# Patient Record
Sex: FEMALE | Race: WHITE | NOT HISPANIC OR LATINO | URBAN - METROPOLITAN AREA
[De-identification: names, ages, dates, MRNs, and addresses within clinical notes are randomized per-mention and may not be internally consistent; named-entity substitution may affect disease eponyms.]

---

## 2017-03-18 ENCOUNTER — EMERGENCY (EMERGENCY)
Facility: HOSPITAL | Age: 42
LOS: 1 days | Discharge: HOME | End: 2017-03-18
Attending: EMERGENCY MEDICINE | Admitting: EMERGENCY MEDICINE
Payer: COMMERCIAL

## 2017-03-18 VITALS
TEMPERATURE: 97 F | HEIGHT: 64 IN | OXYGEN SATURATION: 100 % | SYSTOLIC BLOOD PRESSURE: 133 MMHG | DIASTOLIC BLOOD PRESSURE: 92 MMHG | RESPIRATION RATE: 15 BRPM | WEIGHT: 160.06 LBS | HEART RATE: 84 BPM

## 2017-03-18 DIAGNOSIS — R11.0 NAUSEA: ICD-10-CM

## 2017-03-18 DIAGNOSIS — E03.9 HYPOTHYROIDISM, UNSPECIFIED: ICD-10-CM

## 2017-03-18 DIAGNOSIS — R07.9 CHEST PAIN, UNSPECIFIED: ICD-10-CM

## 2017-03-18 DIAGNOSIS — J34.2 DEVIATED NASAL SEPTUM: Chronic | ICD-10-CM

## 2017-03-18 LAB
ALBUMIN SERPL ELPH-MCNC: 4 G/DL — SIGNIFICANT CHANGE UP (ref 3.3–5)
ALP SERPL-CCNC: 74 U/L — SIGNIFICANT CHANGE UP (ref 40–120)
ALT FLD-CCNC: 29 U/L — SIGNIFICANT CHANGE UP (ref 12–78)
ANION GAP SERPL CALC-SCNC: 11 MMOL/L — SIGNIFICANT CHANGE UP (ref 5–17)
AST SERPL-CCNC: 20 U/L — SIGNIFICANT CHANGE UP (ref 15–37)
BASOPHILS # BLD AUTO: 0.1 K/UL — SIGNIFICANT CHANGE UP (ref 0–0.2)
BASOPHILS NFR BLD AUTO: 1.2 % — SIGNIFICANT CHANGE UP (ref 0–2)
BILIRUB SERPL-MCNC: 0.5 MG/DL — SIGNIFICANT CHANGE UP (ref 0.2–1.2)
BUN SERPL-MCNC: 15 MG/DL — SIGNIFICANT CHANGE UP (ref 7–23)
CALCIUM SERPL-MCNC: 8.8 MG/DL — SIGNIFICANT CHANGE UP (ref 8.5–10.1)
CHLORIDE SERPL-SCNC: 105 MMOL/L — SIGNIFICANT CHANGE UP (ref 96–108)
CK MB BLD-MCNC: <0.5 % — SIGNIFICANT CHANGE UP (ref 0–3.5)
CK MB CFR SERPL CALC: <0.5 NG/ML — SIGNIFICANT CHANGE UP (ref 0–3.6)
CK SERPL-CCNC: 93 U/L — SIGNIFICANT CHANGE UP (ref 26–192)
CO2 SERPL-SCNC: 24 MMOL/L — SIGNIFICANT CHANGE UP (ref 22–31)
CREAT SERPL-MCNC: 0.88 MG/DL — SIGNIFICANT CHANGE UP (ref 0.5–1.3)
D DIMER BLD IA.RAPID-MCNC: 291 NG/ML DDU — HIGH
EOSINOPHIL # BLD AUTO: 0.1 K/UL — SIGNIFICANT CHANGE UP (ref 0–0.5)
EOSINOPHIL NFR BLD AUTO: 1.6 % — SIGNIFICANT CHANGE UP (ref 0–6)
GLUCOSE SERPL-MCNC: 91 MG/DL — SIGNIFICANT CHANGE UP (ref 70–99)
HCG SERPL-ACNC: <1 MIU/ML — SIGNIFICANT CHANGE UP
HCT VFR BLD CALC: 45.2 % — HIGH (ref 34.5–45)
HGB BLD-MCNC: 15 G/DL — SIGNIFICANT CHANGE UP (ref 11.5–15.5)
LYMPHOCYTES # BLD AUTO: 3 K/UL — SIGNIFICANT CHANGE UP (ref 1–3.3)
LYMPHOCYTES # BLD AUTO: 33.1 % — SIGNIFICANT CHANGE UP (ref 13–44)
MCHC RBC-ENTMCNC: 29.2 PG — SIGNIFICANT CHANGE UP (ref 27–34)
MCHC RBC-ENTMCNC: 33.1 GM/DL — SIGNIFICANT CHANGE UP (ref 32–36)
MCV RBC AUTO: 88.1 FL — SIGNIFICANT CHANGE UP (ref 80–100)
MONOCYTES # BLD AUTO: 0.4 K/UL — SIGNIFICANT CHANGE UP (ref 0–0.9)
MONOCYTES NFR BLD AUTO: 4.8 % — SIGNIFICANT CHANGE UP (ref 1–9)
NEUTROPHILS # BLD AUTO: 5.4 K/UL — SIGNIFICANT CHANGE UP (ref 1.8–7.4)
NEUTROPHILS NFR BLD AUTO: 59.4 % — SIGNIFICANT CHANGE UP (ref 43–77)
NT-PROBNP SERPL-SCNC: 35 PG/ML — SIGNIFICANT CHANGE UP (ref 0–125)
PLATELET # BLD AUTO: 217 K/UL — SIGNIFICANT CHANGE UP (ref 150–400)
POTASSIUM SERPL-MCNC: 3.9 MMOL/L — SIGNIFICANT CHANGE UP (ref 3.5–5.3)
POTASSIUM SERPL-SCNC: 3.9 MMOL/L — SIGNIFICANT CHANGE UP (ref 3.5–5.3)
PROT SERPL-MCNC: 7.7 G/DL — SIGNIFICANT CHANGE UP (ref 6–8.3)
RBC # BLD: 5.13 M/UL — SIGNIFICANT CHANGE UP (ref 3.8–5.2)
RBC # FLD: 11 % — SIGNIFICANT CHANGE UP (ref 10.3–14.5)
SODIUM SERPL-SCNC: 140 MMOL/L — SIGNIFICANT CHANGE UP (ref 135–145)
TROPONIN I SERPL-MCNC: <.015 NG/ML — SIGNIFICANT CHANGE UP (ref 0.01–0.04)
WBC # BLD: 9.2 K/UL — SIGNIFICANT CHANGE UP (ref 3.8–10.5)
WBC # FLD AUTO: 9.2 K/UL — SIGNIFICANT CHANGE UP (ref 3.8–10.5)

## 2017-03-18 PROCEDURE — 99285 EMERGENCY DEPT VISIT HI MDM: CPT

## 2017-03-18 PROCEDURE — 71020: CPT | Mod: 26

## 2017-03-18 PROCEDURE — 71275 CT ANGIOGRAPHY CHEST: CPT | Mod: 26

## 2017-03-18 RX ORDER — LIDOCAINE 4 G/100G
10 CREAM TOPICAL ONCE
Qty: 0 | Refills: 0 | Status: COMPLETED | OUTPATIENT
Start: 2017-03-18 | End: 2017-03-18

## 2017-03-18 RX ORDER — KETOROLAC TROMETHAMINE 30 MG/ML
30 SYRINGE (ML) INJECTION ONCE
Qty: 0 | Refills: 0 | Status: DISCONTINUED | OUTPATIENT
Start: 2017-03-18 | End: 2017-03-18

## 2017-03-18 RX ORDER — SODIUM CHLORIDE 9 MG/ML
3 INJECTION INTRAMUSCULAR; INTRAVENOUS; SUBCUTANEOUS ONCE
Qty: 0 | Refills: 0 | Status: COMPLETED | OUTPATIENT
Start: 2017-03-18 | End: 2017-03-18

## 2017-03-18 RX ADMIN — Medication 30 MILLIGRAM(S): at 19:50

## 2017-03-18 RX ADMIN — LIDOCAINE 10 MILLILITER(S): 4 CREAM TOPICAL at 19:50

## 2017-03-18 RX ADMIN — SODIUM CHLORIDE 3 MILLILITER(S): 9 INJECTION INTRAMUSCULAR; INTRAVENOUS; SUBCUTANEOUS at 19:50

## 2017-03-18 NOTE — ED ADULT NURSE NOTE - OBJECTIVE STATEMENT
c/o worsening epigastric pain today.  ekg.  sr with no acute finding.  labs drawn and sent.  medicated as ordered.

## 2017-03-19 VITALS
DIASTOLIC BLOOD PRESSURE: 70 MMHG | TEMPERATURE: 98 F | HEART RATE: 75 BPM | OXYGEN SATURATION: 100 % | RESPIRATION RATE: 17 BRPM | SYSTOLIC BLOOD PRESSURE: 130 MMHG

## 2017-03-19 LAB — TROPONIN I SERPL-MCNC: <.015 NG/ML — SIGNIFICANT CHANGE UP (ref 0.01–0.04)

## 2017-03-19 PROCEDURE — 84702 CHORIONIC GONADOTROPIN TEST: CPT

## 2017-03-19 PROCEDURE — 85379 FIBRIN DEGRADATION QUANT: CPT

## 2017-03-19 PROCEDURE — 71275 CT ANGIOGRAPHY CHEST: CPT

## 2017-03-19 PROCEDURE — 96374 THER/PROPH/DIAG INJ IV PUSH: CPT

## 2017-03-19 PROCEDURE — 84484 ASSAY OF TROPONIN QUANT: CPT

## 2017-03-19 PROCEDURE — 82553 CREATINE MB FRACTION: CPT

## 2017-03-19 PROCEDURE — 80053 COMPREHEN METABOLIC PANEL: CPT

## 2017-03-19 PROCEDURE — 93005 ELECTROCARDIOGRAM TRACING: CPT

## 2017-03-19 PROCEDURE — 85027 COMPLETE CBC AUTOMATED: CPT

## 2017-03-19 PROCEDURE — 99284 EMERGENCY DEPT VISIT MOD MDM: CPT | Mod: 25

## 2017-03-19 PROCEDURE — 83880 ASSAY OF NATRIURETIC PEPTIDE: CPT

## 2017-03-19 PROCEDURE — 71046 X-RAY EXAM CHEST 2 VIEWS: CPT

## 2017-03-19 PROCEDURE — 82550 ASSAY OF CK (CPK): CPT

## 2022-08-01 PROBLEM — E03.9 HYPOTHYROIDISM, UNSPECIFIED: Chronic | Status: ACTIVE | Noted: 2017-03-18

## 2022-08-05 PROBLEM — Z00.00 ENCOUNTER FOR PREVENTIVE HEALTH EXAMINATION: Status: ACTIVE | Noted: 2022-08-05

## 2022-08-08 ENCOUNTER — APPOINTMENT (OUTPATIENT)
Dept: ORTHOPEDIC SURGERY | Facility: CLINIC | Age: 47
End: 2022-08-08

## 2022-08-08 VITALS — BODY MASS INDEX: 29.37 KG/M2 | WEIGHT: 172 LBS | HEIGHT: 64 IN

## 2022-08-08 DIAGNOSIS — M77.12 LATERAL EPICONDYLITIS, LEFT ELBOW: ICD-10-CM

## 2022-08-08 DIAGNOSIS — Z78.9 OTHER SPECIFIED HEALTH STATUS: ICD-10-CM

## 2022-08-08 PROCEDURE — J3490M: CUSTOM

## 2022-08-08 PROCEDURE — 99203 OFFICE O/P NEW LOW 30 MIN: CPT | Mod: 25

## 2022-08-08 PROCEDURE — 73080 X-RAY EXAM OF ELBOW: CPT | Mod: LT

## 2022-08-08 PROCEDURE — 20551 NJX 1 TENDON ORIGIN/INSJ: CPT

## 2022-08-08 NOTE — IMAGING
[de-identified] : Left elbow No swelling, no ecchymosis\par Tenderness to palpation over lateral epicondyle\par Full elbow flexion, extension, supination, pronation\par 5/5 strength in flexion, extension, supination, pronation\par Lateral Elbow pain with resisted wrist extension\par No Varus or Valgus instability\par Motor and sensory intact distally\par  [Left] : left elbow [There are no fractures, subluxations or dislocations. No significant abnormalities are seen] : There are no fractures, subluxations or dislocations. No significant abnormalities are seen

## 2022-08-08 NOTE — HISTORY OF PRESENT ILLNESS
[Gradual] : gradual [6] : 6 [0] : 0 [Localized] : localized [Sharp] : sharp [Tightness] : tightness [Intermittent] : intermittent [Household chores] : household chores [Nothing helps with pain getting better] : Nothing helps with pain getting better [de-identified] : pt presents here today  with left elbow pain for about 2 months\par no injury\par no treatment so far [] : no [FreeTextEntry1] : left elbow [FreeTextEntry5] : no injury [de-identified] : reaching things,pull stuff [de-identified] : nothing

## 2022-08-08 NOTE — ASSESSMENT
[FreeTextEntry1] : ATRAUMATIC LATERAL ELBOW PAIN, NORMAL XRAYS\par CSI FOR RELIEF\par PROPER GRIPPING, LIFTING REVIEWED\par

## 2023-08-14 ENCOUNTER — APPOINTMENT (OUTPATIENT)
Dept: OBGYN | Facility: CLINIC | Age: 48
End: 2023-08-14
Payer: COMMERCIAL

## 2023-08-14 ENCOUNTER — ASOB RESULT (OUTPATIENT)
Age: 48
End: 2023-08-14

## 2023-08-14 VITALS
SYSTOLIC BLOOD PRESSURE: 116 MMHG | HEIGHT: 64 IN | DIASTOLIC BLOOD PRESSURE: 82 MMHG | WEIGHT: 180 LBS | HEART RATE: 91 BPM | BODY MASS INDEX: 30.73 KG/M2

## 2023-08-14 DIAGNOSIS — U07.1 COVID-19: ICD-10-CM

## 2023-08-14 PROCEDURE — 76830 TRANSVAGINAL US NON-OB: CPT

## 2023-08-14 PROCEDURE — 76856 US EXAM PELVIC COMPLETE: CPT | Mod: 59

## 2023-08-14 PROCEDURE — 99396 PREV VISIT EST AGE 40-64: CPT

## 2023-08-14 NOTE — PHYSICAL EXAM
[Chaperone Present] : A chaperone was present in the examining room during all aspects of the physical examination [Appropriately responsive] : appropriately responsive [Alert] : alert [No Acute Distress] : no acute distress [No Lymphadenopathy] : no lymphadenopathy [Regular Rate Rhythm] : regular rate rhythm [Soft] : soft [Clear to Auscultation B/L] : clear to auscultation bilaterally [Non-tender] : non-tender [Non-distended] : non-distended [No Lesions] : no lesions [No Mass] : no mass [Oriented x3] : oriented x3 [Examination Of The Breasts] : a normal appearance [No Masses] : no breast masses were palpable [Labia Majora] : normal [Labia Minora] : normal [Normal] : normal [Enlarged ___ wks] : enlarged [unfilled] ~Uweeks [Anteversion] : anteverted [Uterine Adnexae] : normal [FreeTextEntry6] : No masses, nontender, no skin changes, no nipple discharge, no adenopathy. [Tenderness] : nontender

## 2023-08-14 NOTE — PLAN
[FreeTextEntry1] : Patient is a 48-year-old  2 para 2 last menstrual period 2023 Patient presents for annual visit,, denies any complaints Physical exam reveals a well-developed well-nourished female in no apparent distress,, BMI 31 Heart regular rhythm and rate, lungs clear, breast no mass nontender no skin change or nipple discharge no adenopathy, abdomen soft nontender no organomegaly. Pelvic exam shows normal female external genitalia, vagina lesions, cervix appropriate size nontender, uterus anteverted bulky approximate 12 weeks size, patient is known to have fibroids Adnexa no mass nontender. Pap smear was performed Patient has undergone a mammogram on  with negative findings Patient to undergo a pelvic sonogram to further assess the uterine findings Uterus 14.89 x 8.32 x 6.84 Cervical length 3.05 cm Endometrial thickness 29.5 mm Fibroids 1.  Posterior intramural 8.01 x 9.41 x 5.9 2.  Posterior lower uterine segment intramural 3.33 x 4.84 x 4.75 3.  Posterior segment mucosal 2.78 x 2.77 x 1.95 4.  Right lateral lower uterine segment dilated 4.12 x 5.06 x 3.84 Both ovaries not visualized No adnexal masses seen No free fluid seen Findings discussed at length with the patient Patient will be scheduled for endometrial sampling with history of thickened lining and heavy periods  [place] was present as a chaperone for the entire assessment and examination of this patient

## 2023-08-14 NOTE — HISTORY OF PRESENT ILLNESS
[FreeTextEntry1] : Patient is a 48-year-old  2 para 2 last menstrual period 2023 Patient presents for annual visit,, denies any complaint

## 2023-08-21 LAB — HPV HIGH+LOW RISK DNA PNL CVX: NOT DETECTED

## 2023-08-22 ENCOUNTER — APPOINTMENT (OUTPATIENT)
Dept: OBGYN | Facility: CLINIC | Age: 48
End: 2023-08-22
Payer: COMMERCIAL

## 2023-08-22 VITALS
WEIGHT: 181 LBS | HEART RATE: 94 BPM | DIASTOLIC BLOOD PRESSURE: 81 MMHG | BODY MASS INDEX: 31.07 KG/M2 | SYSTOLIC BLOOD PRESSURE: 121 MMHG

## 2023-08-22 DIAGNOSIS — N92.0 EXCESSIVE AND FREQUENT MENSTRUATION WITH REGULAR CYCLE: ICD-10-CM

## 2023-08-22 DIAGNOSIS — D25.9 LEIOMYOMA OF UTERUS, UNSPECIFIED: ICD-10-CM

## 2023-08-22 DIAGNOSIS — R93.89 ABNORMAL FINDINGS ON DIAGNOSTIC IMAGING OF OTHER SPECIFIED BODY STRUCTURES: ICD-10-CM

## 2023-08-22 PROCEDURE — 81025 URINE PREGNANCY TEST: CPT

## 2023-08-22 PROCEDURE — 99213 OFFICE O/P EST LOW 20 MIN: CPT

## 2023-08-22 NOTE — ASSESSMENT
[FreeTextEntry1] : Patient is a 48-year-old  2 para 2 last menstrual 2023 Patient presents for endometrial biopsy after findings of extremely thickened endometrium lining of 29 mm and enlarged fibroid uterus Patient was placed in the exam room After informed consent was obtained patient was placed in the dorsolithotomy position Sterile speculum is present vaginal vault and the cervix and vagina were cleansed with Betadine Anterior of the cervix was grasped with a single-tooth tenaculum Pipelle sampling catheter was attempted to be passed into the uterine cavity but this was unsuccessful after multiple tries Also was removed the vaginal vault Patient was brought to the consultation room office and discussed that endometrial sampling needs to be performed prior to definitive therapy with hysterectomy Patient will be scheduled for a hysteroscopy D&C under anesthesia in the OR Pending those results definitive therapy will be discussed Patient does complain of extremely heavy periods and pelvic and lower back pain due to the large fibroids  Past medical history patient denies Past surgical history,,, skin Mohs procedure x7 Allergies,,, penicillin Medications,,, patient denies Past OB history,,, vaginal livery x2 Past GYN history,,, medications 13, irregular duration 5 days, denies any history of breast triple use or history of PID or STDs Tobacco use,,, patient denies Alcohol use,,, patient denies Drug use,,, patient denies Family history,,, mother alive and well father alive history hypertension and renal disease denies any family history of breast or ovarian cancer

## 2023-08-22 NOTE — PROCEDURE
[Endometrial Biopsy] : Endometrial biopsy [Consent Obtained] : Consent obtained [Negative] : negative pregnancy test [LMPDate] : 8/1/23

## 2023-08-24 LAB — HCG UR QL: NEGATIVE

## 2023-09-27 ENCOUNTER — OUTPATIENT (OUTPATIENT)
Dept: OUTPATIENT SERVICES | Facility: HOSPITAL | Age: 48
LOS: 1 days | End: 2023-09-27
Payer: COMMERCIAL

## 2023-09-27 VITALS
WEIGHT: 185.19 LBS | HEIGHT: 64 IN | DIASTOLIC BLOOD PRESSURE: 80 MMHG | RESPIRATION RATE: 18 BRPM | SYSTOLIC BLOOD PRESSURE: 125 MMHG | OXYGEN SATURATION: 98 % | HEART RATE: 82 BPM | TEMPERATURE: 98 F

## 2023-09-27 DIAGNOSIS — N93.9 ABNORMAL UTERINE AND VAGINAL BLEEDING, UNSPECIFIED: ICD-10-CM

## 2023-09-27 DIAGNOSIS — Z29.9 ENCOUNTER FOR PROPHYLACTIC MEASURES, UNSPECIFIED: ICD-10-CM

## 2023-09-27 DIAGNOSIS — Z01.818 ENCOUNTER FOR OTHER PREPROCEDURAL EXAMINATION: ICD-10-CM

## 2023-09-27 DIAGNOSIS — R93.89 ABNORMAL FINDINGS ON DIAGNOSTIC IMAGING OF OTHER SPECIFIED BODY STRUCTURES: ICD-10-CM

## 2023-09-27 DIAGNOSIS — J34.2 DEVIATED NASAL SEPTUM: Chronic | ICD-10-CM

## 2023-09-27 DIAGNOSIS — Z98.890 OTHER SPECIFIED POSTPROCEDURAL STATES: Chronic | ICD-10-CM

## 2023-09-27 DIAGNOSIS — N92.0 EXCESSIVE AND FREQUENT MENSTRUATION WITH REGULAR CYCLE: ICD-10-CM

## 2023-09-27 LAB
A1C WITH ESTIMATED AVERAGE GLUCOSE RESULT: 5.3 % — SIGNIFICANT CHANGE UP (ref 4–5.6)
ANION GAP SERPL CALC-SCNC: 12 MMOL/L — SIGNIFICANT CHANGE UP (ref 5–17)
APTT BLD: 30.4 SEC — SIGNIFICANT CHANGE UP (ref 24.5–35.6)
BASOPHILS # BLD AUTO: 0.08 K/UL — SIGNIFICANT CHANGE UP (ref 0–0.2)
BASOPHILS NFR BLD AUTO: 0.9 % — SIGNIFICANT CHANGE UP (ref 0–2)
BLD GP AB SCN SERPL QL: SIGNIFICANT CHANGE UP
BUN SERPL-MCNC: 12.9 MG/DL — SIGNIFICANT CHANGE UP (ref 8–20)
CALCIUM SERPL-MCNC: 9 MG/DL — SIGNIFICANT CHANGE UP (ref 8.4–10.5)
CHLORIDE SERPL-SCNC: 102 MMOL/L — SIGNIFICANT CHANGE UP (ref 96–108)
CO2 SERPL-SCNC: 24 MMOL/L — SIGNIFICANT CHANGE UP (ref 22–29)
CREAT SERPL-MCNC: 0.78 MG/DL — SIGNIFICANT CHANGE UP (ref 0.5–1.3)
EGFR: 92 ML/MIN/1.73M2 — SIGNIFICANT CHANGE UP
EOSINOPHIL # BLD AUTO: 0.14 K/UL — SIGNIFICANT CHANGE UP (ref 0–0.5)
EOSINOPHIL NFR BLD AUTO: 1.7 % — SIGNIFICANT CHANGE UP (ref 0–6)
ESTIMATED AVERAGE GLUCOSE: 105 MG/DL — SIGNIFICANT CHANGE UP (ref 68–114)
GLUCOSE SERPL-MCNC: 89 MG/DL — SIGNIFICANT CHANGE UP (ref 70–99)
HCG SERPL-ACNC: <4 MIU/ML — SIGNIFICANT CHANGE UP
HCT VFR BLD CALC: 39.7 % — SIGNIFICANT CHANGE UP (ref 34.5–45)
HGB BLD-MCNC: 12.8 G/DL — SIGNIFICANT CHANGE UP (ref 11.5–15.5)
IMM GRANULOCYTES NFR BLD AUTO: 0.4 % — SIGNIFICANT CHANGE UP (ref 0–0.9)
INR BLD: 0.91 RATIO — SIGNIFICANT CHANGE UP (ref 0.85–1.18)
LYMPHOCYTES # BLD AUTO: 2.15 K/UL — SIGNIFICANT CHANGE UP (ref 1–3.3)
LYMPHOCYTES # BLD AUTO: 25.5 % — SIGNIFICANT CHANGE UP (ref 13–44)
MCHC RBC-ENTMCNC: 27.1 PG — SIGNIFICANT CHANGE UP (ref 27–34)
MCHC RBC-ENTMCNC: 32.2 GM/DL — SIGNIFICANT CHANGE UP (ref 32–36)
MCV RBC AUTO: 84.1 FL — SIGNIFICANT CHANGE UP (ref 80–100)
MONOCYTES # BLD AUTO: 0.39 K/UL — SIGNIFICANT CHANGE UP (ref 0–0.9)
MONOCYTES NFR BLD AUTO: 4.6 % — SIGNIFICANT CHANGE UP (ref 2–14)
NEUTROPHILS # BLD AUTO: 5.64 K/UL — SIGNIFICANT CHANGE UP (ref 1.8–7.4)
NEUTROPHILS NFR BLD AUTO: 66.9 % — SIGNIFICANT CHANGE UP (ref 43–77)
PLATELET # BLD AUTO: 261 K/UL — SIGNIFICANT CHANGE UP (ref 150–400)
POTASSIUM SERPL-MCNC: 3.8 MMOL/L — SIGNIFICANT CHANGE UP (ref 3.5–5.3)
POTASSIUM SERPL-SCNC: 3.8 MMOL/L — SIGNIFICANT CHANGE UP (ref 3.5–5.3)
PROTHROM AB SERPL-ACNC: 10.1 SEC — SIGNIFICANT CHANGE UP (ref 9.5–13)
RBC # BLD: 4.72 M/UL — SIGNIFICANT CHANGE UP (ref 3.8–5.2)
RBC # FLD: 13.5 % — SIGNIFICANT CHANGE UP (ref 10.3–14.5)
SODIUM SERPL-SCNC: 138 MMOL/L — SIGNIFICANT CHANGE UP (ref 135–145)
T3 SERPL-MCNC: 155 NG/DL — SIGNIFICANT CHANGE UP (ref 80–200)
T4 AB SER-ACNC: 9.7 UG/DL — SIGNIFICANT CHANGE UP (ref 4.5–12)
TSH SERPL-MCNC: 2.9 UIU/ML — SIGNIFICANT CHANGE UP (ref 0.27–4.2)
WBC # BLD: 8.43 K/UL — SIGNIFICANT CHANGE UP (ref 3.8–10.5)
WBC # FLD AUTO: 8.43 K/UL — SIGNIFICANT CHANGE UP (ref 3.8–10.5)

## 2023-09-27 PROCEDURE — G0463: CPT

## 2023-09-27 PROCEDURE — 93005 ELECTROCARDIOGRAM TRACING: CPT

## 2023-09-27 PROCEDURE — 93010 ELECTROCARDIOGRAM REPORT: CPT

## 2023-09-27 RX ORDER — LEVOTHYROXINE SODIUM 125 MCG
1 TABLET ORAL
Qty: 0 | Refills: 0 | DISCHARGE

## 2023-09-27 RX ORDER — HYDROCHLOROTHIAZIDE 25 MG
0 TABLET ORAL
Qty: 0 | Refills: 0 | DISCHARGE

## 2023-09-27 NOTE — H&P PST ADULT - ATTENDING COMMENTS
Patient seen and chart reviewed   Procedure discussed and consent obtained   Questions answered and instructions given  Menorrhagia , fibroid uterus,, Thickened andometrium   Hysteroscopy Fx D+C , possible polypectomy  No change in patient status

## 2023-09-27 NOTE — H&P PST ADULT - NSANTHOSAYNRD_GEN_A_CORE
Pt has persistent yeast infection.  Please advise   No. EDWIGE screening performed.  STOP BANG Legend: 0-2 = LOW Risk; 3-4 = INTERMEDIATE Risk; 5-8 = HIGH Risk

## 2023-09-27 NOTE — H&P PST ADULT - ASSESSMENT
47 y/o female with PMH of borderline hypothyroidism arrives from home to PST with complaints of uterine fibroids, heavy periods, and uterine wall thickening. Pt instructed to stop vitamins/supplements/herbal medications/ASA/NSAIDS for one week prior to surgery and discuss with PMD. Patient educated on surgical scrub, preadmission instructions, medical clearance and day of procedure medications, verbalizes understanding.    OPIOID RISK TOOL    ADRI EACH BOX THAT APPLIES AND ADD TOTALS AT THE END    FAMILY HISTORY OF SUBSTANCE ABUSE                 FEMALE         MALE                                                Alcohol                             [  ]1 pt          [  ]3pts                                               Illegal Durgs                     [  ]2 pts        [  ]3pts                                               Rx Drugs                           [  ]4 pts        [  ]4 pts    PERSONAL HISTORY OF SUBSTANCE ABUSE                                                                                          Alcohol                             [  ]3 pts       [  ]3 pts                                               Illegal Durgs                     [  ]4 pts        [  ]4 pts                                               Rx Drugs                           [  ]5 pts        [  ]5 pts    AGE BETWEEN 16-45 YEARS                                      [  ]1 pt         [  ]1 pt    HISTORY OF PREADOLESCENT   SEXUAL ABUSE                                                             [  ]3 pts        [  ]0pts    PSYCHOLOGICAL DISEASE                     ADD, OCD, Bipolar, Schizophrenia        [  ]2 pts         [  ]2 pts                      Depression                                               [  ]1 pt           [  ]1 pt           SCORING TOTAL   (add numbers and type here)              (**0*)                                     A score of 3 or lower indicated LOW risk for future opiod abuse  A score of 4 to 7 indicated moderate risk for future opiod abuse  A score of 8 or higher indicates a high risk for opiod abuse    CAPRINI SCORE    AGE RELATED RISK FACTORS                                                             [ x] Age 41-60 years                                            (1 Point)  [ ] Age: 61-74 years                                           (2 Points)                 [ ] Age= 75 years                                                (3 Points)             DISEASE RELATED RISK FACTORS                                                       [ ] Edema in the lower extremities                 (1 Point)                     [ ] Varicose veins                                               (1 Point)                                 [x ] BMI > 25 Kg/m2                                            (1 Point)                                  [ ] Serious infection (ie PNA)                            (1 Point)                     [ ] Lung disease ( COPD, Emphysema)            (1 Point)                                                                          [ ] Acute myocardial infarction                         (1 Point)                  [ ] Congestive heart failure (in the previous month)  (1 Point)         [ ] Inflammatory bowel disease                            (1 Point)                  [ ] Central venous access, PICC or Port               (2 points)       (within the last month)                                                                [ ] Stroke (in the previous month)                        (5 Points)    [ ] Previous or present malignancy                       (2 points)                                                                                                                                                         HEMATOLOGY RELATED FACTORS                                                         [ ] Prior episodes of VTE                                     (3 Points)                     [ ] Positive family history for VTE                      (3 Points)                  [ ] Prothrombin 46346 A                                     (3 Points)                     [ ] Factor V Leiden                                                (3 Points)                        [ ] Lupus anticoagulants                                      (3 Points)                                                           [ ] Anticardiolipin antibodies                              (3 Points)                                                       [ ] High homocysteine in the blood                   (3 Points)                                             [ ] Other congenital or acquired thrombophilia      (3 Points)                                                [ ] Heparin induced thrombocytopenia                  (3 Points)                                        MOBILITY RELATED FACTORS  [ ] Bed rest                                                         (1 Point)  [ ] Plaster cast                                                    (2 points)  [ ] Bed bound for more than 72 hours           (2 Points)    GENDER SPECIFIC FACTORS  [ ] Pregnancy or had a baby within the last month   (1 Point)  [ ] Post-partum < 6 weeks                                   (1 Point)  [ ] Hormonal therapy  or oral contraception   (1 Point)  [ ] History of pregnancy complications              (1 point)  [ ] Unexplained or recurrent              (1 Point)    OTHER RISK FACTORS                                           (1 Point)  [ ] BMI >40, smoking, diabetes requiring insulin, chemotherapy  blood transfusions and length of surgery over 2 hours    SURGERY RELATED RISK FACTORS  [ ]  Section within the last month     (1 Point)  [ x] Minor surgery                                                  (1 Point)  [ ] Arthroscopic surgery                                       (2 Points)  [ ] Planned major surgery lasting more            (2 Points)      than 45 minutes     [ ] Elective hip or knee joint replacement       (5 points)       surgery                                                TRAUMA RELATED RISK FACTORS  [ ] Fracture of the hip, pelvis, or leg                       (5 Points)  [ ] Spinal cord injury resulting in paralysis             (5 points)       (in the previous month)    [ ] Paralysis  (less than 1 month)                             (5 Points)  [ ] Multiple Trauma within 1 month                        (5 Points)    Total Score [    3    ]    Caprini Score 0-2: Low Risk, NO VTE prophylaxis required for most patients, encourage ambulation  Caprini Score 3-6: Moderate Risk , pharmacologic VTE prophylaxis is indicated for most patients (in the absence of contraindications)  Caprini Score Greater than or =7: High risk, pharmocologic VTE prophylaxis indicated for most patients (in the absence of contraindications)

## 2023-09-27 NOTE — H&P PST ADULT - HISTORY OF PRESENT ILLNESS
Patient is a 48-year-old  2 para 2 last menstrual period 2023  Patient presents for annual visit,, denies any complaint  Patient is a 48-year-old  2 para 2 last menstrual period 2023  Patient presents for annual visit,, denies any complaint     uterine wall thickening    huge fibroids      Patient is a 48-year-old  2 para 2 last menstrual period 2023  Patient presents for annual visit,, denies any complaint     uterine wall thickening    huge fibroids   23  heavy periods   denies any bleeding at this time     49 y/o female with PMH of borderline hypothyroidism arrives from home to PST with complaints of uterine fibroids, heavy periods, and uterine wall thickening. Reports that during an annual visit was told her uterine wall appeared thicker and to have a biopsy. She had a discussion with the surgeon to have a hysterectomy due to hx of large fibroids that she is also thinking of having done. Her LMP was 23 and  . She denies any pain or vaginal bleeding at this time. She is scheduled for Dilation and Curettage Hysteroscopy with MD Hermosillo on 10/11/23.

## 2023-09-27 NOTE — ED ADULT NURSE NOTE - ILLNESS RECENT EXPOSURE
You can access the FollowMyHealth Patient Portal offered by [facility] by registering at the following website: [email or web address]. By joining [email or web address]’s FollowMyHealth portal, you will also be able to view your health information using other applications (apps) compatible with our system. None known

## 2023-09-27 NOTE — H&P PST ADULT - NSANTHAGERD_ENT_A_CORE
Patient seen and examined by me in clinic.  She is aware of the problems, proposed management and alternatives.  She wants to go ahead with the surgery.
Yes

## 2023-10-10 ENCOUNTER — TRANSCRIPTION ENCOUNTER (OUTPATIENT)
Age: 48
End: 2023-10-10

## 2023-10-11 ENCOUNTER — RESULT REVIEW (OUTPATIENT)
Age: 48
End: 2023-10-11

## 2023-10-11 ENCOUNTER — APPOINTMENT (OUTPATIENT)
Dept: OBGYN | Facility: HOSPITAL | Age: 48
End: 2023-10-11
Payer: COMMERCIAL

## 2023-10-11 ENCOUNTER — TRANSCRIPTION ENCOUNTER (OUTPATIENT)
Age: 48
End: 2023-10-11

## 2023-10-11 ENCOUNTER — OUTPATIENT (OUTPATIENT)
Dept: INPATIENT UNIT | Facility: HOSPITAL | Age: 48
LOS: 1 days | End: 2023-10-11
Payer: COMMERCIAL

## 2023-10-11 VITALS
OXYGEN SATURATION: 97 % | WEIGHT: 185.19 LBS | SYSTOLIC BLOOD PRESSURE: 118 MMHG | DIASTOLIC BLOOD PRESSURE: 73 MMHG | TEMPERATURE: 99 F | RESPIRATION RATE: 18 BRPM | HEART RATE: 72 BPM | HEIGHT: 64 IN

## 2023-10-11 VITALS
TEMPERATURE: 98 F | RESPIRATION RATE: 14 BRPM | SYSTOLIC BLOOD PRESSURE: 107 MMHG | HEART RATE: 80 BPM | OXYGEN SATURATION: 96 % | DIASTOLIC BLOOD PRESSURE: 64 MMHG

## 2023-10-11 DIAGNOSIS — R93.89 ABNORMAL FINDINGS ON DIAGNOSTIC IMAGING OF OTHER SPECIFIED BODY STRUCTURES: ICD-10-CM

## 2023-10-11 DIAGNOSIS — N92.0 EXCESSIVE AND FREQUENT MENSTRUATION WITH REGULAR CYCLE: ICD-10-CM

## 2023-10-11 DIAGNOSIS — J34.2 DEVIATED NASAL SEPTUM: Chronic | ICD-10-CM

## 2023-10-11 DIAGNOSIS — Z98.890 OTHER SPECIFIED POSTPROCEDURAL STATES: Chronic | ICD-10-CM

## 2023-10-11 PROCEDURE — 88305 TISSUE EXAM BY PATHOLOGIST: CPT | Mod: 26

## 2023-10-11 PROCEDURE — 88305 TISSUE EXAM BY PATHOLOGIST: CPT

## 2023-10-11 PROCEDURE — 58558 HYSTEROSCOPY BIOPSY: CPT

## 2023-10-11 RX ORDER — ACETAMINOPHEN 500 MG
2 TABLET ORAL
Qty: 24 | Refills: 0
Start: 2023-10-11 | End: 2023-10-13

## 2023-10-11 RX ORDER — FENTANYL CITRATE 50 UG/ML
25 INJECTION INTRAVENOUS
Refills: 0 | Status: DISCONTINUED | OUTPATIENT
Start: 2023-10-11 | End: 2023-10-11

## 2023-10-11 RX ORDER — HYDROMORPHONE HYDROCHLORIDE 2 MG/ML
0.5 INJECTION INTRAMUSCULAR; INTRAVENOUS; SUBCUTANEOUS
Refills: 0 | Status: DISCONTINUED | OUTPATIENT
Start: 2023-10-11 | End: 2023-10-11

## 2023-10-11 RX ORDER — IBUPROFEN 200 MG
1 TABLET ORAL
Qty: 12 | Refills: 0
Start: 2023-10-11 | End: 2023-10-13

## 2023-10-11 RX ORDER — ONDANSETRON 8 MG/1
4 TABLET, FILM COATED ORAL ONCE
Refills: 0 | Status: COMPLETED | OUTPATIENT
Start: 2023-10-11 | End: 2023-10-11

## 2023-10-11 RX ORDER — CELECOXIB 200 MG/1
400 CAPSULE ORAL ONCE
Refills: 0 | Status: COMPLETED | OUTPATIENT
Start: 2023-10-11 | End: 2023-10-11

## 2023-10-11 RX ORDER — ACETAMINOPHEN 500 MG
650 TABLET ORAL EVERY 6 HOURS
Refills: 0 | Status: DISCONTINUED | OUTPATIENT
Start: 2023-10-11 | End: 2023-10-25

## 2023-10-11 RX ORDER — IBUPROFEN 200 MG
600 TABLET ORAL EVERY 6 HOURS
Refills: 0 | Status: DISCONTINUED | OUTPATIENT
Start: 2023-10-11 | End: 2023-10-25

## 2023-10-11 RX ORDER — SODIUM CHLORIDE 9 MG/ML
3 INJECTION INTRAMUSCULAR; INTRAVENOUS; SUBCUTANEOUS ONCE
Refills: 0 | Status: DISCONTINUED | OUTPATIENT
Start: 2023-10-11 | End: 2023-10-11

## 2023-10-11 RX ORDER — ACETAMINOPHEN 500 MG
975 TABLET ORAL ONCE
Refills: 0 | Status: COMPLETED | OUTPATIENT
Start: 2023-10-11 | End: 2023-10-11

## 2023-10-11 RX ADMIN — Medication 600 MILLIGRAM(S): at 13:41

## 2023-10-11 RX ADMIN — Medication 600 MILLIGRAM(S): at 13:17

## 2023-10-11 RX ADMIN — Medication 975 MILLIGRAM(S): at 09:24

## 2023-10-11 RX ADMIN — ONDANSETRON 4 MILLIGRAM(S): 8 TABLET, FILM COATED ORAL at 13:01

## 2023-10-11 RX ADMIN — HYDROMORPHONE HYDROCHLORIDE 0.5 MILLIGRAM(S): 2 INJECTION INTRAMUSCULAR; INTRAVENOUS; SUBCUTANEOUS at 12:05

## 2023-10-11 RX ADMIN — CELECOXIB 400 MILLIGRAM(S): 200 CAPSULE ORAL at 09:24

## 2023-10-11 RX ADMIN — HYDROMORPHONE HYDROCHLORIDE 0.5 MILLIGRAM(S): 2 INJECTION INTRAMUSCULAR; INTRAVENOUS; SUBCUTANEOUS at 11:50

## 2023-10-11 NOTE — BRIEF OPERATIVE NOTE - NSICDXBRIEFPROCEDURE_GEN_ALL_CORE_FT
PROCEDURES:  Hysteroscopy 11-Oct-2023 11:18:44  Tier, Sejal  Dilation and curettage, uterus 11-Oct-2023 11:18:51  Sejal Rebolledo

## 2023-10-11 NOTE — ASU PREOP CHECKLIST - SURGICAL CONSENT
EDUCATION: Patient education given on endocrine follow up and the patient expresses understanding and acceptance of instructions.  Bella Larios RN 5/9/2019 10:02 PM 
 
 done

## 2023-10-11 NOTE — BRIEF OPERATIVE NOTE - OPERATION/FINDINGS
Grossly normal cervix. Uterus inspected on hysteroscopy and found to have thickened endometrial lining. Dilation & curettage was preformed, ECC sample sent to pathology. An intramural uterine fibroid was noted in the anterior wall of the uterus. No polyps visualized.

## 2023-10-11 NOTE — ASU PREOP CHECKLIST - PATIENT SENT TO
Comments (Non-Sticky): Cleansed with ELTA foaming removed with cool 4x4 used skinbetter scrub removed with cool towel extracted white heads all over face used aha mask in revision line removed with cool 4x4 applied sheer hydration with spf
Price (Use Numbers Only, No Special Characters Or $): 75
Detail Level: Detailed
Extraction Method: extractor
Exfoliation Type Override: refresh exfoliating scrub/mask
Treatment Type (Optional): Deep Cleanse Treatment
Mask Type (Optional): purifying
Treatment Type Override: detox and clean facial
Exfoliation Type: gentle
operating room

## 2023-10-11 NOTE — BRIEF OPERATIVE NOTE - NSICDXBRIEFPOSTOP_GEN_ALL_CORE_FT
POST-OP DIAGNOSIS:  Thickened endometrium 11-Oct-2023 11:19:08  Amaury, Sejal  Intramural uterine fibroid 11-Oct-2023 11:19:25  Sejal Rebolledo

## 2023-10-11 NOTE — ASU DISCHARGE PLAN (ADULT/PEDIATRIC) - CARE PROVIDER_API CALL
Radha Hermosillo  Obstetrics and Gynecology  [address] [place] [id number]  Phone: [phone]  Fax: [phone]  Established Patient  Follow Up Time: 1 week

## 2023-10-18 LAB
SURGICAL PATHOLOGY STUDY: SIGNIFICANT CHANGE UP
SURGICAL PATHOLOGY STUDY: SIGNIFICANT CHANGE UP

## 2023-10-24 ENCOUNTER — APPOINTMENT (OUTPATIENT)
Dept: OBGYN | Facility: CLINIC | Age: 48
End: 2023-10-24
Payer: COMMERCIAL

## 2023-10-24 VITALS
DIASTOLIC BLOOD PRESSURE: 88 MMHG | HEART RATE: 105 BPM | BODY MASS INDEX: 31.07 KG/M2 | WEIGHT: 181 LBS | SYSTOLIC BLOOD PRESSURE: 132 MMHG

## 2023-10-24 DIAGNOSIS — Z98.890 OTHER SPECIFIED POSTPROCEDURAL STATES: ICD-10-CM

## 2023-10-24 PROCEDURE — 99024 POSTOP FOLLOW-UP VISIT: CPT

## 2024-01-08 NOTE — ED PROVIDER NOTE - HEAD, MLM
Spoke with patient advised, a lot of people are sick right now and appointments are filling up fast. Patient made appointment with Dr Martinez for 10:00 am   
[facility] Appointment Request   Provider: Dr. Genao Only  Appointment Type: Same Day  Reason for Visit: Painful urination since Sunday, January 7th recently came off of Antibiotic   Available Day and Time: Today  Best Contact Number: [phone]      
Head is atraumatic. Head shape is symmetrical.

## 2024-01-29 NOTE — ED PROVIDER NOTE - PSH
Wound Clinic Physician Orders and Discharge Instructions  [facility]  [address], [address]  [place] [address]  Telephone: [phone]     FAX [phone]    NAME:  Ramin Brown Jr.  YOB: 1946  MEDICAL RECORD NUMBER:  [id number]  DATE:  1/29/2024      Return Appointment:  [] Dressing Supply Provider:   [x] Home Healthcare: [facility]. Fax [phone]  [x] Return Appointment: 1 Week(s)  [] Nurse Visit:     [] [facility]charge from [facility]: [] Healed            [] Refer to Provider:    Follow-up Information:  [x] Ordered Tests: culture obtained in clinic  [x] Referrals: Dr. Mcclellan  [] Rx:   [x] Other: Lymph pump twice daily x 1 hour each    Wound Cleansing:   Do not scrub or use excessive force.  Cleanse wound prior to applying a clean dressing with:  [] Normal Saline   [] Keep Wound Dry in Shower     [] Wound Cleanser   [x] Cleanse wound with Mild Soap & Water    [] Other:       Topical Treatments:  Do not apply lotions, creams, or ointments to wound bed unless directed.   [] Apply moisturizing lotion to skin surrounding the wound prior to dressing change.  [] Apply antifungal ointment to skin surrounding the wound prior to dressing change.  [] Apply thin film of moisture barrier ointment to skin immediately around wound.  [] Apply Betadine to skim immediately around wound      Dressings:           Wound Location R Leg   [x] Apply Primary Dressing:       [] MediHoney Gel [] MediHoney Alginate  [x] Calcium Alginate with Silver   [] Calcium Alginate without silver   [] Collagen with silver [] Collagen without Silver    [] Santyl with moist saline gauze     [] Hydrofera Blue (cut to size and moistened with normal saline)  [] Hydrofera Blue Ready (cut to size)      [] Normal Saline wet to dry  [] Betadine wet to dry    [] Hydrogel  [] Mepitel     [] Bactroban/Mupirocin   [] Iodoform Packing Strip [] Plain Packing Strip   [] Skin Sub:   [] Other:     [x] Cover and  Deviated septum

## 2024-03-21 ENCOUNTER — OUTPATIENT (OUTPATIENT)
Dept: OUTPATIENT SERVICES | Facility: HOSPITAL | Age: 49
LOS: 1 days | End: 2024-03-21
Payer: COMMERCIAL

## 2024-03-21 ENCOUNTER — NON-APPOINTMENT (OUTPATIENT)
Age: 49
End: 2024-03-21

## 2024-03-21 VITALS
SYSTOLIC BLOOD PRESSURE: 118 MMHG | DIASTOLIC BLOOD PRESSURE: 60 MMHG | HEIGHT: 63 IN | HEART RATE: 76 BPM | WEIGHT: 187.83 LBS | RESPIRATION RATE: 18 BRPM | OXYGEN SATURATION: 97 % | TEMPERATURE: 98 F

## 2024-03-21 DIAGNOSIS — N92.0 EXCESSIVE AND FREQUENT MENSTRUATION WITH REGULAR CYCLE: ICD-10-CM

## 2024-03-21 DIAGNOSIS — Z01.818 ENCOUNTER FOR OTHER PREPROCEDURAL EXAMINATION: ICD-10-CM

## 2024-03-21 DIAGNOSIS — D25.9 LEIOMYOMA OF UTERUS, UNSPECIFIED: ICD-10-CM

## 2024-03-21 DIAGNOSIS — Z98.890 OTHER SPECIFIED POSTPROCEDURAL STATES: Chronic | ICD-10-CM

## 2024-03-21 DIAGNOSIS — K46.9 UNSPECIFIED ABDOMINAL HERNIA WITHOUT OBSTRUCTION OR GANGRENE: ICD-10-CM

## 2024-03-21 DIAGNOSIS — Z29.9 ENCOUNTER FOR PROPHYLACTIC MEASURES, UNSPECIFIED: ICD-10-CM

## 2024-03-21 DIAGNOSIS — E03.9 HYPOTHYROIDISM, UNSPECIFIED: ICD-10-CM

## 2024-03-21 DIAGNOSIS — J34.2 DEVIATED NASAL SEPTUM: Chronic | ICD-10-CM

## 2024-03-21 LAB
A1C WITH ESTIMATED AVERAGE GLUCOSE RESULT: 5.9 % — HIGH (ref 4–5.6)
ALBUMIN SERPL ELPH-MCNC: 4 G/DL — SIGNIFICANT CHANGE UP (ref 3.3–5.2)
ALP SERPL-CCNC: 86 U/L — SIGNIFICANT CHANGE UP (ref 40–120)
ALT FLD-CCNC: 25 U/L — SIGNIFICANT CHANGE UP
ANION GAP SERPL CALC-SCNC: 12 MMOL/L — SIGNIFICANT CHANGE UP (ref 5–17)
APTT BLD: 29.6 SEC — SIGNIFICANT CHANGE UP (ref 24.5–35.6)
AST SERPL-CCNC: 31 U/L — SIGNIFICANT CHANGE UP
BASOPHILS # BLD AUTO: 0.06 K/UL — SIGNIFICANT CHANGE UP (ref 0–0.2)
BASOPHILS NFR BLD AUTO: 0.9 % — SIGNIFICANT CHANGE UP (ref 0–2)
BILIRUB SERPL-MCNC: 0.5 MG/DL — SIGNIFICANT CHANGE UP (ref 0.4–2)
BLD GP AB SCN SERPL QL: SIGNIFICANT CHANGE UP
BUN SERPL-MCNC: 13.4 MG/DL — SIGNIFICANT CHANGE UP (ref 8–20)
CALCIUM SERPL-MCNC: 9 MG/DL — SIGNIFICANT CHANGE UP (ref 8.4–10.5)
CHLORIDE SERPL-SCNC: 102 MMOL/L — SIGNIFICANT CHANGE UP (ref 96–108)
CO2 SERPL-SCNC: 24 MMOL/L — SIGNIFICANT CHANGE UP (ref 22–29)
CREAT SERPL-MCNC: 0.8 MG/DL — SIGNIFICANT CHANGE UP (ref 0.5–1.3)
EGFR: 90 ML/MIN/1.73M2 — SIGNIFICANT CHANGE UP
EOSINOPHIL # BLD AUTO: 0.13 K/UL — SIGNIFICANT CHANGE UP (ref 0–0.5)
EOSINOPHIL NFR BLD AUTO: 2 % — SIGNIFICANT CHANGE UP (ref 0–6)
ESTIMATED AVERAGE GLUCOSE: 123 MG/DL — HIGH (ref 68–114)
GLUCOSE SERPL-MCNC: 91 MG/DL — SIGNIFICANT CHANGE UP (ref 70–99)
HCG SERPL-ACNC: <4 MIU/ML — SIGNIFICANT CHANGE UP
HCT VFR BLD CALC: 40.1 % — SIGNIFICANT CHANGE UP (ref 34.5–45)
HGB BLD-MCNC: 12.9 G/DL — SIGNIFICANT CHANGE UP (ref 11.5–15.5)
IMM GRANULOCYTES NFR BLD AUTO: 0.2 % — SIGNIFICANT CHANGE UP (ref 0–0.9)
INR BLD: 0.92 RATIO — SIGNIFICANT CHANGE UP (ref 0.85–1.18)
LYMPHOCYTES # BLD AUTO: 1.96 K/UL — SIGNIFICANT CHANGE UP (ref 1–3.3)
LYMPHOCYTES # BLD AUTO: 29.6 % — SIGNIFICANT CHANGE UP (ref 13–44)
MCHC RBC-ENTMCNC: 26.3 PG — LOW (ref 27–34)
MCHC RBC-ENTMCNC: 32.2 GM/DL — SIGNIFICANT CHANGE UP (ref 32–36)
MCV RBC AUTO: 81.8 FL — SIGNIFICANT CHANGE UP (ref 80–100)
MONOCYTES # BLD AUTO: 0.33 K/UL — SIGNIFICANT CHANGE UP (ref 0–0.9)
MONOCYTES NFR BLD AUTO: 5 % — SIGNIFICANT CHANGE UP (ref 2–14)
NEUTROPHILS # BLD AUTO: 4.14 K/UL — SIGNIFICANT CHANGE UP (ref 1.8–7.4)
NEUTROPHILS NFR BLD AUTO: 62.3 % — SIGNIFICANT CHANGE UP (ref 43–77)
PLATELET # BLD AUTO: 231 K/UL — SIGNIFICANT CHANGE UP (ref 150–400)
POTASSIUM SERPL-MCNC: 4.5 MMOL/L — SIGNIFICANT CHANGE UP (ref 3.5–5.3)
POTASSIUM SERPL-SCNC: 4.5 MMOL/L — SIGNIFICANT CHANGE UP (ref 3.5–5.3)
PROT SERPL-MCNC: 7.1 G/DL — SIGNIFICANT CHANGE UP (ref 6.6–8.7)
PROTHROM AB SERPL-ACNC: 10.2 SEC — SIGNIFICANT CHANGE UP (ref 9.5–13)
RBC # BLD: 4.9 M/UL — SIGNIFICANT CHANGE UP (ref 3.8–5.2)
RBC # FLD: 13.2 % — SIGNIFICANT CHANGE UP (ref 10.3–14.5)
SODIUM SERPL-SCNC: 138 MMOL/L — SIGNIFICANT CHANGE UP (ref 135–145)
T3 SERPL-MCNC: 132 NG/DL — SIGNIFICANT CHANGE UP (ref 80–200)
T4 AB SER-ACNC: 9.3 UG/DL — SIGNIFICANT CHANGE UP (ref 4.5–12)
TSH SERPL-MCNC: 2.14 UIU/ML — SIGNIFICANT CHANGE UP (ref 0.27–4.2)
WBC # BLD: 6.63 K/UL — SIGNIFICANT CHANGE UP (ref 3.8–10.5)
WBC # FLD AUTO: 6.63 K/UL — SIGNIFICANT CHANGE UP (ref 3.8–10.5)

## 2024-03-21 PROCEDURE — G0463: CPT

## 2024-03-21 PROCEDURE — 93010 ELECTROCARDIOGRAM REPORT: CPT

## 2024-03-21 PROCEDURE — 93005 ELECTROCARDIOGRAM TRACING: CPT

## 2024-03-21 RX ORDER — GENTAMICIN SULFATE 40 MG/ML
330 VIAL (ML) INJECTION ONCE
Refills: 0 | Status: DISCONTINUED | OUTPATIENT
Start: 2024-04-10 | End: 2024-04-13

## 2024-03-21 NOTE — H&P PST ADULT - GASTROINTESTINAL
negative normal/soft/nontender/nondistended/normal active bowel sounds details… normal/soft/nontender/nondistended/normal active bowel sounds/no guarding/no rigidity/no organomegaly/no palpable lyudmila/no masses palpable

## 2024-03-21 NOTE — H&P PST ADULT - NSICDXPASTSURGICALHX_GEN_ALL_CORE_FT
PAST SURGICAL HISTORY:  Deviated septum     S/P Mohs surgery for basal cell carcinoma      PAST SURGICAL HISTORY:  Deviated septum     H/O dilation and curettage     S/P Mohs surgery for basal cell carcinoma

## 2024-03-21 NOTE — H&P PST ADULT - ASSESSMENT
49 y/o female with PMH of borderline hypothyroidism arrives from home to PST with complaints of uterine fibroids, heavy periods, and uterine wall thickening. Pt instructed to stop vitamins/supplements/herbal medications/ASA/NSAIDS for one week prior to surgery and discuss with PMD. Patient educated on surgical scrub, preadmission instructions, medical clearance and day of procedure medications, verbalizes understanding.    OPIOID RISK TOOL    ADRI EACH BOX THAT APPLIES AND ADD TOTALS AT THE END    FAMILY HISTORY OF SUBSTANCE ABUSE                 FEMALE         MALE                                                Alcohol                             [  ]1 pt          [  ]3pts                                               Illegal Durgs                     [  ]2 pts        [  ]3pts                                               Rx Drugs                           [  ]4 pts        [  ]4 pts    PERSONAL HISTORY OF SUBSTANCE ABUSE                                                                                          Alcohol                             [  ]3 pts       [  ]3 pts                                               Illegal Durgs                     [  ]4 pts        [  ]4 pts                                               Rx Drugs                           [  ]5 pts        [  ]5 pts    AGE BETWEEN 16-45 YEARS                                      [  ]1 pt         [  ]1 pt    HISTORY OF PREADOLESCENT   SEXUAL ABUSE                                                             [  ]3 pts        [  ]0pts    PSYCHOLOGICAL DISEASE                     ADD, OCD, Bipolar, Schizophrenia        [  ]2 pts         [  ]2 pts                      Depression                                               [  ]1 pt           [  ]1 pt           SCORING TOTAL   (add numbers and type here)              (**0*)                                     A score of 3 or lower indicated LOW risk for future opiod abuse  A score of 4 to 7 indicated moderate risk for future opiod abuse  A score of 8 or higher indicates a high risk for opiod abuse    CAPRINI SCORE    AGE RELATED RISK FACTORS                                                             [ x] Age 41-60 years                                            (1 Point)  [ ] Age: 61-74 years                                           (2 Points)                 [ ] Age= 75 years                                                (3 Points)             DISEASE RELATED RISK FACTORS                                                       [ ] Edema in the lower extremities                 (1 Point)                     [ ] Varicose veins                                               (1 Point)                                 [x ] BMI > 25 Kg/m2                                            (1 Point)                                  [ ] Serious infection (ie PNA)                            (1 Point)                     [ ] Lung disease ( COPD, Emphysema)            (1 Point)                                                                          [ ] Acute myocardial infarction                         (1 Point)                  [ ] Congestive heart failure (in the previous month)  (1 Point)         [ ] Inflammatory bowel disease                            (1 Point)                  [ ] Central venous access, PICC or Port               (2 points)       (within the last month)                                                                [ ] Stroke (in the previous month)                        (5 Points)    [ ] Previous or present malignancy                       (2 points)                                                                                                                                                         HEMATOLOGY RELATED FACTORS                                                         [ ] Prior episodes of VTE                                     (3 Points)                     [ ] Positive family history for VTE                      (3 Points)                  [ ] Prothrombin 94442 A                                     (3 Points)                     [ ] Factor V Leiden                                                (3 Points)                        [ ] Lupus anticoagulants                                      (3 Points)                                                           [ ] Anticardiolipin antibodies                              (3 Points)                                                       [ ] High homocysteine in the blood                   (3 Points)                                             [ ] Other congenital or acquired thrombophilia      (3 Points)                                                [ ] Heparin induced thrombocytopenia                  (3 Points)                                        MOBILITY RELATED FACTORS  [ ] Bed rest                                                         (1 Point)  [ ] Plaster cast                                                    (2 points)  [ ] Bed bound for more than 72 hours           (2 Points)    GENDER SPECIFIC FACTORS  [ ] Pregnancy or had a baby within the last month   (1 Point)  [ ] Post-partum < 6 weeks                                   (1 Point)  [ ] Hormonal therapy  or oral contraception   (1 Point)  [ ] History of pregnancy complications              (1 point)  [ ] Unexplained or recurrent              (1 Point)    OTHER RISK FACTORS                                           (1 Point)  [ ] BMI >40, smoking, diabetes requiring insulin, chemotherapy  blood transfusions and length of surgery over 2 hours    SURGERY RELATED RISK FACTORS  [ ]  Section within the last month     (1 Point)  [ x] Minor surgery                                                  (1 Point)  [ ] Arthroscopic surgery                                       (2 Points)  [ ] Planned major surgery lasting more            (2 Points)      than 45 minutes     [ ] Elective hip or knee joint replacement       (5 points)       surgery                                                TRAUMA RELATED RISK FACTORS  [ ] Fracture of the hip, pelvis, or leg                       (5 Points)  [ ] Spinal cord injury resulting in paralysis             (5 points)       (in the previous month)    [ ] Paralysis  (less than 1 month)                             (5 Points)  [ ] Multiple Trauma within 1 month                        (5 Points)    Total Score [    3    ]    Caprini Score 0-2: Low Risk, NO VTE prophylaxis required for most patients, encourage ambulation  Caprini Score 3-6: Moderate Risk , pharmacologic VTE prophylaxis is indicated for most patients (in the absence of contraindications)  Caprini Score Greater than or =7: High risk, pharmocologic VTE prophylaxis indicated for most patients (in the absence of contraindications)        Problem/Plan - 1:  ·  Problem: Abnormal uterine bleeding.   ·  Plan: She is scheduled for Dilation and Curettage Hysteroscopy with MD Hermosillo on 10/11/23.  Problem/Plan - 2:  ·  Problem: Need for prophylactic measure.   ·  Plan: Caprini Score 3: Moderate Risk , pharmacologic VTE prophylaxis is indicated for most patients (in the absence of contraindications). 47 y/o , LMP 3/10/24, female with PMH of borderline hypothyroidism arrives from home to PST with complaints of uterine fibroids, heavy periods, and uterine wall thickening. Reports that during an annual visit was told her uterine wall appeared thicker and to have a biopsy. She had a discussion with the surgeon to have a hysterectomy due to hx of large fibroids that she is also thinking of having done. ABD pain prior to menstruation, during and after 8/10 cramping pain. Patient has experienced symptoms for over 2 years. She denies any pain or vaginal bleeding at this time. She is scheduled for Total Abdominal Hysterectomy with B/l Salpingectomy, anterior adjacent tissue rearrangement, abdominal wall reconstruction scheduled 4/10/24 with MD Hermosillo. Patient educated on surgical scrub, preadmission instructions, ERP reviewed and day of procedure medications, pt. verbalizes understanding and agreement. Labs pending. ECG completed. No clearance needed at this time, explained to patient potential for clearance based on labs results.       OPIOID RISK TOOL    ADRI EACH BOX THAT APPLIES AND ADD TOTALS AT THE END    FAMILY HISTORY OF SUBSTANCE ABUSE                 FEMALE         MALE                                                Alcohol                             [  ]1 pt          [  ]3pts                                               Illegal Durgs                     [  ]2 pts        [  ]3pts                                               Rx Drugs                           [  ]4 pts        [  ]4 pts    PERSONAL HISTORY OF SUBSTANCE ABUSE                                                                                          Alcohol                             [  ]3 pts       [  ]3 pts                                               Illegal Durgs                     [  ]4 pts        [  ]4 pts                                               Rx Drugs                           [  ]5 pts        [  ]5 pts    AGE BETWEEN 16-45 YEARS                                      [  ]1 pt         [  ]1 pt    HISTORY OF PREADOLESCENT   SEXUAL ABUSE                                                             [  ]3 pts        [  ]0pts    PSYCHOLOGICAL DISEASE                     ADD, OCD, Bipolar, Schizophrenia        [  ]2 pts         [  ]2 pts                      Depression                                               [  ]1 pt           [  ]1 pt           SCORING TOTAL   (add numbers and type here)              (**0*)                                     A score of 3 or lower indicated LOW risk for future opiod abuse  A score of 4 to 7 indicated moderate risk for future opiod abuse  A score of 8 or higher indicates a high risk for opiod abuse    CAPRINI SCORE    AGE RELATED RISK FACTORS                                                             [ x] Age 41-60 years                                            (1 Point)  [ ] Age: 61-74 years                                           (2 Points)                 [ ] Age= 75 years                                                (3 Points)             DISEASE RELATED RISK FACTORS                                                       [ ] Edema in the lower extremities                 (1 Point)                     [ ] Varicose veins                                               (1 Point)                                 [x ] BMI > 25 Kg/m2                                            (1 Point)                                  [ ] Serious infection (ie PNA)                            (1 Point)                     [ ] Lung disease ( COPD, Emphysema)            (1 Point)                                                                          [ ] Acute myocardial infarction                         (1 Point)                  [ ] Congestive heart failure (in the previous month)  (1 Point)         [ ] Inflammatory bowel disease                            (1 Point)                  [ ] Central venous access, PICC or Port               (2 points)       (within the last month)                                                                [ ] Stroke (in the previous month)                        (5 Points)    [ ] Previous or present malignancy                       (2 points)                                                                                                                                                         HEMATOLOGY RELATED FACTORS                                                         [ ] Prior episodes of VTE                                     (3 Points)                     [ ] Positive family history for VTE                      (3 Points)                  [ ] Prothrombin 36381 A                                     (3 Points)                     [ ] Factor V Leiden                                                (3 Points)                        [ ] Lupus anticoagulants                                      (3 Points)                                                           [ ] Anticardiolipin antibodies                              (3 Points)                                                       [ ] High homocysteine in the blood                   (3 Points)                                             [ ] Other congenital or acquired thrombophilia      (3 Points)                                                [ ] Heparin induced thrombocytopenia                  (3 Points)                                        MOBILITY RELATED FACTORS  [ ] Bed rest                                                         (1 Point)  [ ] Plaster cast                                                    (2 points)  [ ] Bed bound for more than 72 hours           (2 Points)    GENDER SPECIFIC FACTORS  [ ] Pregnancy or had a baby within the last month   (1 Point)  [ ] Post-partum < 6 weeks                                   (1 Point)  [ ] Hormonal therapy  or oral contraception   (1 Point)  [ ] History of pregnancy complications              (1 point)  [ ] Unexplained or recurrent              (1 Point)    OTHER RISK FACTORS                                           (1 Point)  [ ] BMI >40, smoking, diabetes requiring insulin, chemotherapy  blood transfusions and length of surgery over 2 hours    SURGERY RELATED RISK FACTORS  [ ]  Section within the last month     (1 Point)  [ x] Minor surgery                                                  (1 Point)  [ ] Arthroscopic surgery                                       (2 Points)  [ ] Planned major surgery lasting more            (2 Points)      than 45 minutes     [ ] Elective hip or knee joint replacement       (5 points)       surgery                                                TRAUMA RELATED RISK FACTORS  [ ] Fracture of the hip, pelvis, or leg                       (5 Points)  [ ] Spinal cord injury resulting in paralysis             (5 points)       (in the previous month)    [ ] Paralysis  (less than 1 month)                             (5 Points)  [ ] Multiple Trauma within 1 month                        (5 Points)    Total Score [    3    ]    Caprini Score 0-2: Low Risk, NO VTE prophylaxis required for most patients, encourage ambulation  Caprini Score 3-6: Moderate Risk , pharmacologic VTE prophylaxis is indicated for most patients (in the absence of contraindications)  Caprini Score Greater than or =7: High risk, pharmocologic VTE prophylaxis indicated for most patients (in the absence of contraindications)        Problem/Plan - 1:  ·  Problem: Abnormal uterine bleeding.   ·  Plan: She is scheduled for Dilation and Curettage Hysteroscopy with MD Hermosillo on 10/11/23.  Problem/Plan - 2:  ·  Problem: Need for prophylactic measure.   ·  Plan: Caprini Score 3: Moderate Risk , pharmacologic VTE prophylaxis is indicated for most patients (in the absence of contraindications). 49 y/o , LMP 3/10/24, female with PMH of borderline hypothyroidism arrives from home to PST with complaints of uterine fibroids, heavy periods, and uterine wall thickening. Reports that during an annual visit was told her uterine wall appeared thicker and to have a biopsy. She had a discussion with the surgeon to have a hysterectomy due to hx of large fibroids that she is also thinking of having done. ABD pain prior to menstruation, during and after 8/10 cramping pain. Patient has experienced symptoms for over 2 years. She denies any pain or vaginal bleeding at this time. She is scheduled for Total Abdominal Hysterectomy with B/l Salpingectomy, anterior adjacent tissue rearrangement, abdominal wall reconstruction scheduled 4/10/24 with MD Hermosillo. Patient educated on surgical scrub, preadmission instructions, ERP reviewed and day of procedure medications, pt. verbalizes understanding and agreement. Labs pending. ECG completed. No clearance needed at this time, explained to patient potential for clearance based on labs results. Patient told to stop all NSAIDs/Suppliments/Vitamins starting 4/3/24.       OPIOID RISK TOOL    ADRI EACH BOX THAT APPLIES AND ADD TOTALS AT THE END    FAMILY HISTORY OF SUBSTANCE ABUSE                 FEMALE         MALE                                                Alcohol                             [  ]1 pt          [  ]3pts                                               Illegal Durgs                     [  ]2 pts        [  ]3pts                                               Rx Drugs                           [  ]4 pts        [  ]4 pts    PERSONAL HISTORY OF SUBSTANCE ABUSE                                                                                          Alcohol                             [  ]3 pts       [  ]3 pts                                               Illegal Durgs                     [  ]4 pts        [  ]4 pts                                               Rx Drugs                           [  ]5 pts        [  ]5 pts    AGE BETWEEN 16-45 YEARS                                      [  ]1 pt         [  ]1 pt    HISTORY OF PREADOLESCENT   SEXUAL ABUSE                                                             [  ]3 pts        [  ]0pts    PSYCHOLOGICAL DISEASE                     ADD, OCD, Bipolar, Schizophrenia        [  ]2 pts         [  ]2 pts                      Depression                                               [  ]1 pt           [  ]1 pt           SCORING TOTAL   (add numbers and type here)              (**0*)                                     A score of 3 or lower indicated LOW risk for future opiod abuse  A score of 4 to 7 indicated moderate risk for future opiod abuse  A score of 8 or higher indicates a high risk for opiod abuse    CAPRINI SCORE    AGE RELATED RISK FACTORS                                                             [ x] Age 41-60 years                                            (1 Point)  [ ] Age: 61-74 years                                           (2 Points)                 [ ] Age= 75 years                                                (3 Points)             DISEASE RELATED RISK FACTORS                                                       [ ] Edema in the lower extremities                 (1 Point)                     [ ] Varicose veins                                               (1 Point)                                 [x ] BMI > 25 Kg/m2                                            (1 Point)                                  [ ] Serious infection (ie PNA)                            (1 Point)                     [ ] Lung disease ( COPD, Emphysema)            (1 Point)                                                                          [ ] Acute myocardial infarction                         (1 Point)                  [ ] Congestive heart failure (in the previous month)  (1 Point)         [ ] Inflammatory bowel disease                            (1 Point)                  [ ] Central venous access, PICC or Port               (2 points)       (within the last month)                                                                [ ] Stroke (in the previous month)                        (5 Points)    [x ] Previous or present malignancy                       (2 points)                                                                                                                                                         HEMATOLOGY RELATED FACTORS                                                         [ ] Prior episodes of VTE                                     (3 Points)                     [ ] Positive family history for VTE                      (3 Points)                  [ ] Prothrombin 22555 A                                     (3 Points)                     [ ] Factor V Leiden                                                (3 Points)                        [ ] Lupus anticoagulants                                      (3 Points)                                                           [ ] Anticardiolipin antibodies                              (3 Points)                                                       [ ] High homocysteine in the blood                   (3 Points)                                             [ ] Other congenital or acquired thrombophilia      (3 Points)                                                [ ] Heparin induced thrombocytopenia                  (3 Points)                                        MOBILITY RELATED FACTORS  [ ] Bed rest                                                         (1 Point)  [ ] Plaster cast                                                    (2 points)  [ ] Bed bound for more than 72 hours           (2 Points)    GENDER SPECIFIC FACTORS  [ ] Pregnancy or had a baby within the last month   (1 Point)  [ ] Post-partum < 6 weeks                                   (1 Point)  [ ] Hormonal therapy  or oral contraception   (1 Point)  [ ] History of pregnancy complications              (1 point)  [ ] Unexplained or recurrent              (1 Point)    OTHER RISK FACTORS                                           (1 Point)  [x ] BMI >40, smoking, diabetes requiring insulin, chemotherapy  blood transfusions and length of surgery over 2 hours    SURGERY RELATED RISK FACTORS  [ ]  Section within the last month     (1 Point)  [ ] Minor surgery                                                  (1 Point)  [ ] Arthroscopic surgery                                       (2 Points)  [x ] Planned major surgery lasting more            (2 Points)      than 45 minutes     [ ] Elective hip or knee joint replacement       (5 points)       surgery                                                TRAUMA RELATED RISK FACTORS  [ ] Fracture of the hip, pelvis, or leg                       (5 Points)  [ ] Spinal cord injury resulting in paralysis             (5 points)       (in the previous month)    [ ] Paralysis  (less than 1 month)                             (5 Points)  [ ] Multiple Trauma within 1 month                        (5 Points)    Total Score [   7    ]    Caprini Score 0-2: Low Risk, NO VTE prophylaxis required for most patients, encourage ambulation  Caprini Score 3-6: Moderate Risk , pharmacologic VTE prophylaxis is indicated for most patients (in the absence of contraindications)  Caprini Score Greater than or =7: High risk, pharmocologic VTE prophylaxis indicated for most patients (in the absence of contraindications)

## 2024-03-21 NOTE — H&P PST ADULT - NEGATIVE NEUROLOGICAL SYMPTOMS
no transient paralysis/no weakness/no paresthesias/no generalized seizures/no focal seizures/no syncope/no tremors/no vertigo/no loss of sensation/no difficulty walking

## 2024-03-21 NOTE — H&P PST ADULT - CARDIOVASCULAR
normal/regular rate and rhythm/S1 S2 present/no gallops/no rub/no murmur negative normal/regular rate and rhythm/S1 S2 present/no gallops/no rub/no murmur/no pedal edema

## 2024-03-21 NOTE — H&P PST ADULT - PROBLEM SELECTOR PLAN 4
Patient states as not been an issue but has not followed up with PCP since her doctor passed away. Labs pending. Medical Clearance Requested.

## 2024-03-21 NOTE — H&P PST ADULT - ATTENDING COMMENTS
Patient seen and chart reviewed   Procedure discussed and consent obtained  Questions answered and instructions given  Fibroid Uterus     BRIANNA / BSO  No change in patient status

## 2024-03-21 NOTE — H&P PST ADULT - RESPIRATORY
normal/clear to auscultation bilaterally/no wheezes/no rales/no rhonchi normal/clear to auscultation bilaterally/no wheezes/no rales/no rhonchi/no respiratory distress/no use of accessory muscles/airway patent/breath sounds equal/good air movement/respirations non-labored/no intercostal retractions

## 2024-03-21 NOTE — H&P PST ADULT - PROBLEM SELECTOR PLAN 2
Pt scheduled for Total Abdominal Hysterectomy with B/l Salpingectomy, anterior adjacent tissue rearrangement, abdominal wall reconstruction scheduled 4/10/24 with MD Hermosillo.

## 2024-03-21 NOTE — H&P PST ADULT - HISTORY OF PRESENT ILLNESS
49 y/o female with PMH of borderline hypothyroidism arrives from home to PST with complaints of uterine fibroids, heavy periods, and uterine wall thickening. Reports that during an annual visit was told her uterine wall appeared thicker and to have a biopsy. She had a discussion with the surgeon to have a hysterectomy due to hx of large fibroids that she is also thinking of having done. Her LMP was 23 and  . She denies any pain or vaginal bleeding at this time. She is scheduled for Dilation and Curettage Hysteroscopy with MD Hermosillo on 10/11/23.   47 y/o , LMP 3/10/24, female with PMH of borderline hypothyroidism arrives from home to PST with complaints of uterine fibroids, heavy periods, and uterine wall thickening. Reports that during an annual visit was told her uterine wall appeared thicker and to have a biopsy. She had a discussion with the surgeon to have a hysterectomy due to hx of large fibroids that she is also thinking of having done. ABD pain prior to menstruation, during and after 8/10 cramping pain. Patient has experienced symptoms for over 2 years. She denies any pain or vaginal bleeding at this time. She is scheduled for Total Abdominal Hysterectomy with B/l Salpingectomy, anterior adjacent tissue rearrangement, abdominal wall reconstruction scheduled 4/10/24 with MD Hermosillo.

## 2024-03-21 NOTE — H&P PST ADULT - NEGATIVE GASTROINTESTINAL SYMPTOMS
How Severe Is This Condition?: mild Additional History: 0/10 on pain scale. no nausea/no vomiting/no diarrhea/no constipation/no change in bowel habits/no flatulence/no abdominal pain/no melena

## 2024-04-09 ENCOUNTER — TRANSCRIPTION ENCOUNTER (OUTPATIENT)
Age: 49
End: 2024-04-09

## 2024-04-09 NOTE — ASU PATIENT PROFILE, ADULT - NSICDXPASTSURGICALHX_GEN_ALL_CORE_FT
PAST SURGICAL HISTORY:  Deviated septum     H/O dilation and curettage     S/P Mohs surgery for basal cell carcinoma

## 2024-04-10 ENCOUNTER — APPOINTMENT (OUTPATIENT)
Dept: OBGYN | Facility: HOSPITAL | Age: 49
End: 2024-04-10

## 2024-04-10 ENCOUNTER — INPATIENT (INPATIENT)
Facility: HOSPITAL | Age: 49
LOS: 2 days | Discharge: HOME | DRG: 742 | End: 2024-04-13
Attending: OBSTETRICS & GYNECOLOGY | Admitting: OBSTETRICS & GYNECOLOGY
Payer: COMMERCIAL

## 2024-04-10 ENCOUNTER — RESULT REVIEW (OUTPATIENT)
Age: 49
End: 2024-04-10

## 2024-04-10 VITALS
WEIGHT: 187.83 LBS | DIASTOLIC BLOOD PRESSURE: 87 MMHG | HEIGHT: 63 IN | OXYGEN SATURATION: 100 % | SYSTOLIC BLOOD PRESSURE: 138 MMHG | HEART RATE: 94 BPM | RESPIRATION RATE: 18 BRPM | TEMPERATURE: 98 F

## 2024-04-10 DIAGNOSIS — Z98.890 OTHER SPECIFIED POSTPROCEDURAL STATES: Chronic | ICD-10-CM

## 2024-04-10 DIAGNOSIS — J34.2 DEVIATED NASAL SEPTUM: Chronic | ICD-10-CM

## 2024-04-10 DIAGNOSIS — K46.0 UNSPECIFIED ABDOMINAL HERNIA WITH OBSTRUCTION, WITHOUT GANGRENE: ICD-10-CM

## 2024-04-10 LAB
HCT VFR BLD CALC: 33.2 % — LOW (ref 34.5–45)
HGB BLD-MCNC: 11 G/DL — LOW (ref 11.5–15.5)
MCHC RBC-ENTMCNC: 26.8 PG — LOW (ref 27–34)
MCHC RBC-ENTMCNC: 33.1 GM/DL — SIGNIFICANT CHANGE UP (ref 32–36)
MCV RBC AUTO: 81 FL — SIGNIFICANT CHANGE UP (ref 80–100)
PLATELET # BLD AUTO: 236 K/UL — SIGNIFICANT CHANGE UP (ref 150–400)
RBC # BLD: 4.1 M/UL — SIGNIFICANT CHANGE UP (ref 3.8–5.2)
RBC # FLD: 13.3 % — SIGNIFICANT CHANGE UP (ref 10.3–14.5)
WBC # BLD: 17.13 K/UL — HIGH (ref 3.8–10.5)
WBC # FLD AUTO: 17.13 K/UL — HIGH (ref 3.8–10.5)

## 2024-04-10 PROCEDURE — 58180 PARTIAL HYSTERECTOMY: CPT

## 2024-04-10 PROCEDURE — 93010 ELECTROCARDIOGRAM REPORT: CPT

## 2024-04-10 PROCEDURE — 88307 TISSUE EXAM BY PATHOLOGIST: CPT | Mod: 26

## 2024-04-10 PROCEDURE — 88305 TISSUE EXAM BY PATHOLOGIST: CPT | Mod: 26

## 2024-04-10 RX ORDER — ONDANSETRON 8 MG/1
4 TABLET, FILM COATED ORAL ONCE
Refills: 0 | Status: DISCONTINUED | OUTPATIENT
Start: 2024-04-10 | End: 2024-04-10

## 2024-04-10 RX ORDER — KETOROLAC TROMETHAMINE 30 MG/ML
30 SYRINGE (ML) INJECTION EVERY 8 HOURS
Refills: 0 | Status: DISCONTINUED | OUTPATIENT
Start: 2024-04-10 | End: 2024-04-11

## 2024-04-10 RX ORDER — VANCOMYCIN HCL 1 G
1250 VIAL (EA) INTRAVENOUS ONCE
Refills: 0 | Status: COMPLETED | OUTPATIENT
Start: 2024-04-10 | End: 2024-04-10

## 2024-04-10 RX ORDER — ACETAMINOPHEN 500 MG
975 TABLET ORAL EVERY 6 HOURS
Refills: 0 | Status: DISCONTINUED | OUTPATIENT
Start: 2024-04-10 | End: 2024-04-13

## 2024-04-10 RX ORDER — FENTANYL CITRATE 50 UG/ML
50 INJECTION INTRAVENOUS
Refills: 0 | Status: DISCONTINUED | OUTPATIENT
Start: 2024-04-10 | End: 2024-04-10

## 2024-04-10 RX ORDER — OXYCODONE HYDROCHLORIDE 5 MG/1
10 TABLET ORAL EVERY 4 HOURS
Refills: 0 | Status: DISCONTINUED | OUTPATIENT
Start: 2024-04-10 | End: 2024-04-13

## 2024-04-10 RX ORDER — CELECOXIB 200 MG/1
400 CAPSULE ORAL ONCE
Refills: 0 | Status: COMPLETED | OUTPATIENT
Start: 2024-04-10 | End: 2024-04-10

## 2024-04-10 RX ORDER — IBUPROFEN 200 MG
600 TABLET ORAL EVERY 6 HOURS
Refills: 0 | Status: DISCONTINUED | OUTPATIENT
Start: 2024-04-10 | End: 2024-04-13

## 2024-04-10 RX ORDER — INFLUENZA VIRUS VACCINE 15; 15; 15; 15 UG/.5ML; UG/.5ML; UG/.5ML; UG/.5ML
0.5 SUSPENSION INTRAMUSCULAR ONCE
Refills: 0 | Status: DISCONTINUED | OUTPATIENT
Start: 2024-04-10 | End: 2024-04-13

## 2024-04-10 RX ORDER — SIMETHICONE 80 MG/1
80 TABLET, CHEWABLE ORAL EVERY 6 HOURS
Refills: 0 | Status: DISCONTINUED | OUTPATIENT
Start: 2024-04-10 | End: 2024-04-13

## 2024-04-10 RX ORDER — ENOXAPARIN SODIUM 100 MG/ML
40 INJECTION SUBCUTANEOUS EVERY 24 HOURS
Refills: 0 | Status: DISCONTINUED | OUTPATIENT
Start: 2024-04-10 | End: 2024-04-12

## 2024-04-10 RX ORDER — APREPITANT 80 MG/1
40 CAPSULE ORAL ONCE
Refills: 0 | Status: COMPLETED | OUTPATIENT
Start: 2024-04-10 | End: 2024-04-10

## 2024-04-10 RX ORDER — ACETAMINOPHEN 500 MG
975 TABLET ORAL ONCE
Refills: 0 | Status: COMPLETED | OUTPATIENT
Start: 2024-04-10 | End: 2024-04-10

## 2024-04-10 RX ORDER — SODIUM CHLORIDE 9 MG/ML
1000 INJECTION, SOLUTION INTRAVENOUS
Refills: 0 | Status: DISCONTINUED | OUTPATIENT
Start: 2024-04-10 | End: 2024-04-11

## 2024-04-10 RX ORDER — HYDROMORPHONE HYDROCHLORIDE 2 MG/ML
0.5 INJECTION INTRAMUSCULAR; INTRAVENOUS; SUBCUTANEOUS
Refills: 0 | Status: DISCONTINUED | OUTPATIENT
Start: 2024-04-10 | End: 2024-04-10

## 2024-04-10 RX ORDER — SODIUM CHLORIDE 9 MG/ML
1000 INJECTION, SOLUTION INTRAVENOUS
Refills: 0 | Status: DISCONTINUED | OUTPATIENT
Start: 2024-04-10 | End: 2024-04-10

## 2024-04-10 RX ORDER — SODIUM CHLORIDE 9 MG/ML
3 INJECTION INTRAMUSCULAR; INTRAVENOUS; SUBCUTANEOUS EVERY 8 HOURS
Refills: 0 | Status: DISCONTINUED | OUTPATIENT
Start: 2024-04-10 | End: 2024-04-10

## 2024-04-10 RX ORDER — ONDANSETRON 8 MG/1
8 TABLET, FILM COATED ORAL EVERY 8 HOURS
Refills: 0 | Status: DISCONTINUED | OUTPATIENT
Start: 2024-04-10 | End: 2024-04-13

## 2024-04-10 RX ORDER — OXYCODONE HYDROCHLORIDE 5 MG/1
5 TABLET ORAL
Refills: 0 | Status: DISCONTINUED | OUTPATIENT
Start: 2024-04-10 | End: 2024-04-13

## 2024-04-10 RX ADMIN — APREPITANT 40 MILLIGRAM(S): 80 CAPSULE ORAL at 07:10

## 2024-04-10 RX ADMIN — Medication 30 MILLIGRAM(S): at 20:35

## 2024-04-10 RX ADMIN — SODIUM CHLORIDE 75 MILLILITER(S): 9 INJECTION, SOLUTION INTRAVENOUS at 12:15

## 2024-04-10 RX ADMIN — SODIUM CHLORIDE 125 MILLILITER(S): 9 INJECTION, SOLUTION INTRAVENOUS at 17:28

## 2024-04-10 RX ADMIN — Medication 975 MILLIGRAM(S): at 07:00

## 2024-04-10 RX ADMIN — Medication 975 MILLIGRAM(S): at 18:53

## 2024-04-10 RX ADMIN — Medication 30 MILLIGRAM(S): at 21:35

## 2024-04-10 RX ADMIN — CELECOXIB 400 MILLIGRAM(S): 200 CAPSULE ORAL at 07:00

## 2024-04-10 RX ADMIN — SODIUM CHLORIDE 125 MILLILITER(S): 9 INJECTION, SOLUTION INTRAVENOUS at 13:10

## 2024-04-10 RX ADMIN — OXYCODONE HYDROCHLORIDE 10 MILLIGRAM(S): 5 TABLET ORAL at 21:36

## 2024-04-10 RX ADMIN — Medication 975 MILLIGRAM(S): at 18:23

## 2024-04-10 RX ADMIN — ENOXAPARIN SODIUM 40 MILLIGRAM(S): 100 INJECTION SUBCUTANEOUS at 21:28

## 2024-04-10 RX ADMIN — HYDROMORPHONE HYDROCHLORIDE 0.5 MILLIGRAM(S): 2 INJECTION INTRAMUSCULAR; INTRAVENOUS; SUBCUTANEOUS at 13:41

## 2024-04-10 RX ADMIN — Medication 166.67 MILLIGRAM(S): at 07:15

## 2024-04-10 RX ADMIN — OXYCODONE HYDROCHLORIDE 10 MILLIGRAM(S): 5 TABLET ORAL at 20:36

## 2024-04-10 NOTE — BRIEF OPERATIVE NOTE - OPERATION/FINDINGS
Bulky fibroid uterus with multiple fundal and posterior myomas as well as cervical myoma. Normal appearing tubes. Normal R ovary. L ovary enlarged to 5 cm

## 2024-04-10 NOTE — PATIENT PROFILE ADULT - NSPROPOAPRESSUREINJURY_GEN_A_NUR
Called patient, who reported she picked up apixaban refill last week and the medication is affordable at this time. Patient reports she is happy with the new medication and plans to stay on it long-term. Informed patient she can still contact clinic with anticoagulation-related questions or if she is ever restarted on warfarin. Patient verbalizes understanding.   no

## 2024-04-10 NOTE — ASU PREOP CHECKLIST - HIBICLENS SHOWER 3 DATE
BERTO TURCIOS is a 68y Female s/p ROBOTIC ASSISTED RIGHT TOTAL KNEE ARTHROPLASTY WITH DELFINA    PAINROBOTIC ASSISTED RIGHT TOTAL KNEE ARTHROPLASTY WITH DELFINA        denies any chest pain shortness of breath palpitation dizziness lightheadedness nausea vomiting fever or chills    T(C): 36.4 (09-22-23 @ 11:05), Max: 37.2 (09-21-23 @ 15:10)  HR: 75 (09-22-23 @ 11:05) (71 - 108)  BP: 99/66 (09-22-23 @ 11:05) (99/66 - 138/89)  RR: 16 (09-22-23 @ 11:05) (12 - 21)  SpO2: 94% (09-22-23 @ 11:05) (90% - 98%)  no jvd/bruit  s1 s2 rrr  cta  s/nt/nd  no calf tend                        11.9   12.23 )-----------( 180      ( 22 Sep 2023 05:27 )             36.5   09-22    138  |  105  |  18  ----------------------------<  147<H>  3.8   |  26  |  0.77    Ca    7.9<L>      22 Sep 2023 05:27        cont dvt px  pain control  bowel regimen  antiemetics  incentive spirometer
Patient is seen and examined at bedside. Denies CP/SOB/Dizziness/N/V/D/HA. Pain is controlled.     Vital Signs Last 24 Hrs  T(C): 36.4 (22 Sep 2023 11:05), Max: 37.2 (21 Sep 2023 15:10)  T(F): 97.6 (22 Sep 2023 11:05), Max: 98.9 (21 Sep 2023 15:10)  HR: 75 (22 Sep 2023 11:05) (71 - 108)  BP: 99/66 (22 Sep 2023 11:05) (99/66 - 136/74)  BP(mean): --  RR: 16 (22 Sep 2023 11:05) (12 - 21)  SpO2: 94% (22 Sep 2023 11:05) (90% - 95%)    Parameters below as of 22 Sep 2023 11:05  Patient On (Oxygen Delivery Method): room air          PHYSICAL EXAM:  General: NAD  Neuro:  Alert & responsive  HEENT: NCAT, EOMI, conjunctiva clear  abd: soft, NT/ND  Right LE: Prineo dressing C/D/I.  Motor intact + EHL/FHL/TA/GS.  Sensation is grossly intact.  Extremity warm, compartments soft, compressible. No calf tenderness. DP 2+   Left LE: Motor intact +EHL/FHL/TA/GS. Sensation is grossly intact. Extremity warm, compartments soft, compressible. No calf tenderness. DP2+    Labs:                          11.9   12.23 )-----------( 180      ( 22 Sep 2023 05:27 )             36.5       09-22    138  |  105  |  18  ----------------------------<  147<H>  3.8   |  26  |  0.77    Ca    7.9<L>      22 Sep 2023 05:27        A/P: Patient is a 68y y/o Female s/p right TKA, POD # 1  -wound care, knee extension/leg elevation, cryocuff, isometric exercises, new medications reviewed with pt  -Pain control/analgesia reviewed   -Inc spirometry reviewed with pt, demonstrated competence  -DVT prophylaxis with Venodynes/Aspirin  -PT/OT/WBAT  -complete prophylactic Antibiotic  -medical consult reviewed   -DC planning: for home today with home care  -Pt seen in [facility]    
Post-op Check   POD#0 S/P Right TKA   68yFemale Patient seen and examined, Pain controlled  Patient Denies SOB, CP, N/V/D       PE: Knee/LE: Dressing C/D/I, Sensation/motor intact, DP 2+, FROM ankle/toes   B/L LE: Skin intact. +ROM hip/knee/ankle/toes. Ankle Dorsi/plantarflexion: 5/5. Calf: soft, compressible and nontender. DP/PT 2+ NVI                          13.1   6.52  )-----------( 177      ( 21 Sep 2023 11:31 )             38.7       09-21    137  |  106  |  18  ----------------------------<  107<H>  4.8   |  28  |  0.77    Ca    8.0<L>      21 Sep 2023 11:31          A: As above   P: Pain Control       DVT Prophylaxis      Incentive spirometry      PT WBAT RLE       Isometric exercises      Discharge Planning      All the above discussed and understood by pt       Ortho to F/U 
08-Apr-2024 08:00

## 2024-04-10 NOTE — BRIEF OPERATIVE NOTE - NSICDXBRIEFPROCEDURE_GEN_ALL_CORE_FT
PROCEDURES:  Hysterectomy, supracervical, with bilateral salpingo-oophorectomy 10-Apr-2024 12:45:12  Alie Dickens

## 2024-04-10 NOTE — ASU PREOP CHECKLIST - DNR CLARIFICATION FORM COMPLETED
Health Maintenance Due   Topic Date Due   • Pneumococcal Vaccine 0-64 (1 of 1 - PPSV23) 02/22/1990   • Depression Screening  02/22/1996   • Cervical Cancer Screening HPV CO-Testing  02/22/2014   • Influenza Vaccine (1) 09/01/2019       Patient is due for topics as listed above but is not proceeding with Immunization(s) Influenza at this time.     ** refusing flu vaccine.          Recent PHQ 2/9 Score    PHQ 2:       PHQ 9:      Most Recent PARTHA 7 Score         n/a

## 2024-04-11 ENCOUNTER — TRANSCRIPTION ENCOUNTER (OUTPATIENT)
Age: 49
End: 2024-04-11

## 2024-04-11 LAB
ALBUMIN SERPL ELPH-MCNC: 3.2 G/DL — LOW (ref 3.3–5.2)
ALP SERPL-CCNC: 64 U/L — SIGNIFICANT CHANGE UP (ref 40–120)
ALT FLD-CCNC: 14 U/L — SIGNIFICANT CHANGE UP
ANION GAP SERPL CALC-SCNC: 13 MMOL/L — SIGNIFICANT CHANGE UP (ref 5–17)
AST SERPL-CCNC: 21 U/L — SIGNIFICANT CHANGE UP
BASOPHILS # BLD AUTO: 0.01 K/UL — SIGNIFICANT CHANGE UP (ref 0–0.2)
BASOPHILS NFR BLD AUTO: 0.1 % — SIGNIFICANT CHANGE UP (ref 0–2)
BILIRUB SERPL-MCNC: 0.3 MG/DL — LOW (ref 0.4–2)
BUN SERPL-MCNC: 9.6 MG/DL — SIGNIFICANT CHANGE UP (ref 8–20)
CALCIUM SERPL-MCNC: 8 MG/DL — LOW (ref 8.4–10.5)
CHLORIDE SERPL-SCNC: 103 MMOL/L — SIGNIFICANT CHANGE UP (ref 96–108)
CO2 SERPL-SCNC: 22 MMOL/L — SIGNIFICANT CHANGE UP (ref 22–29)
CREAT SERPL-MCNC: 0.85 MG/DL — SIGNIFICANT CHANGE UP (ref 0.5–1.3)
EGFR: 83 ML/MIN/1.73M2 — SIGNIFICANT CHANGE UP
EOSINOPHIL # BLD AUTO: 0.01 K/UL — SIGNIFICANT CHANGE UP (ref 0–0.5)
EOSINOPHIL NFR BLD AUTO: 0.1 % — SIGNIFICANT CHANGE UP (ref 0–6)
GLUCOSE SERPL-MCNC: 106 MG/DL — HIGH (ref 70–99)
HCT VFR BLD CALC: 28.5 % — LOW (ref 34.5–45)
HGB BLD-MCNC: 9.3 G/DL — LOW (ref 11.5–15.5)
IMM GRANULOCYTES NFR BLD AUTO: 0.4 % — SIGNIFICANT CHANGE UP (ref 0–0.9)
LYMPHOCYTES # BLD AUTO: 1 K/UL — SIGNIFICANT CHANGE UP (ref 1–3.3)
LYMPHOCYTES # BLD AUTO: 7.3 % — LOW (ref 13–44)
MCHC RBC-ENTMCNC: 26.7 PG — LOW (ref 27–34)
MCHC RBC-ENTMCNC: 32.6 GM/DL — SIGNIFICANT CHANGE UP (ref 32–36)
MCV RBC AUTO: 81.9 FL — SIGNIFICANT CHANGE UP (ref 80–100)
MONOCYTES # BLD AUTO: 0.65 K/UL — SIGNIFICANT CHANGE UP (ref 0–0.9)
MONOCYTES NFR BLD AUTO: 4.7 % — SIGNIFICANT CHANGE UP (ref 2–14)
NEUTROPHILS # BLD AUTO: 12.06 K/UL — HIGH (ref 1.8–7.4)
NEUTROPHILS NFR BLD AUTO: 87.4 % — HIGH (ref 43–77)
PLATELET # BLD AUTO: 219 K/UL — SIGNIFICANT CHANGE UP (ref 150–400)
POTASSIUM SERPL-MCNC: 4.2 MMOL/L — SIGNIFICANT CHANGE UP (ref 3.5–5.3)
POTASSIUM SERPL-SCNC: 4.2 MMOL/L — SIGNIFICANT CHANGE UP (ref 3.5–5.3)
PROT SERPL-MCNC: 5.6 G/DL — LOW (ref 6.6–8.7)
RBC # BLD: 3.48 M/UL — LOW (ref 3.8–5.2)
RBC # FLD: 13.7 % — SIGNIFICANT CHANGE UP (ref 10.3–14.5)
SODIUM SERPL-SCNC: 138 MMOL/L — SIGNIFICANT CHANGE UP (ref 135–145)
WBC # BLD: 13.79 K/UL — HIGH (ref 3.8–10.5)
WBC # FLD AUTO: 13.79 K/UL — HIGH (ref 3.8–10.5)

## 2024-04-11 RX ORDER — OXYCODONE HYDROCHLORIDE 5 MG/1
1 TABLET ORAL
Qty: 10 | Refills: 0
Start: 2024-04-11

## 2024-04-11 RX ORDER — FAMOTIDINE 10 MG/ML
20 INJECTION INTRAVENOUS DAILY
Refills: 0 | Status: DISCONTINUED | OUTPATIENT
Start: 2024-04-11 | End: 2024-04-13

## 2024-04-11 RX ORDER — ASCORBIC ACID 60 MG
500 TABLET,CHEWABLE ORAL DAILY
Refills: 0 | Status: DISCONTINUED | OUTPATIENT
Start: 2024-04-11 | End: 2024-04-13

## 2024-04-11 RX ORDER — POLYETHYLENE GLYCOL 3350 17 G/17G
17 POWDER, FOR SOLUTION ORAL DAILY
Refills: 0 | Status: DISCONTINUED | OUTPATIENT
Start: 2024-04-11 | End: 2024-04-13

## 2024-04-11 RX ORDER — FERROUS SULFATE 325(65) MG
325 TABLET ORAL DAILY
Refills: 0 | Status: DISCONTINUED | OUTPATIENT
Start: 2024-04-11 | End: 2024-04-13

## 2024-04-11 RX ORDER — FOLIC ACID 0.8 MG
1 TABLET ORAL DAILY
Refills: 0 | Status: DISCONTINUED | OUTPATIENT
Start: 2024-04-11 | End: 2024-04-13

## 2024-04-11 RX ADMIN — Medication 975 MILLIGRAM(S): at 00:08

## 2024-04-11 RX ADMIN — Medication 30 MILLIGRAM(S): at 13:17

## 2024-04-11 RX ADMIN — Medication 975 MILLIGRAM(S): at 07:04

## 2024-04-11 RX ADMIN — Medication 975 MILLIGRAM(S): at 12:05

## 2024-04-11 RX ADMIN — Medication 975 MILLIGRAM(S): at 23:12

## 2024-04-11 RX ADMIN — Medication 975 MILLIGRAM(S): at 01:08

## 2024-04-11 RX ADMIN — Medication 30 MILLIGRAM(S): at 14:17

## 2024-04-11 RX ADMIN — Medication 30 MILLIGRAM(S): at 07:03

## 2024-04-11 RX ADMIN — ENOXAPARIN SODIUM 40 MILLIGRAM(S): 100 INJECTION SUBCUTANEOUS at 23:12

## 2024-04-11 RX ADMIN — Medication 975 MILLIGRAM(S): at 06:04

## 2024-04-11 RX ADMIN — Medication 975 MILLIGRAM(S): at 11:00

## 2024-04-11 RX ADMIN — OXYCODONE HYDROCHLORIDE 10 MILLIGRAM(S): 5 TABLET ORAL at 02:55

## 2024-04-11 RX ADMIN — Medication 30 MILLIGRAM(S): at 06:03

## 2024-04-11 RX ADMIN — OXYCODONE HYDROCHLORIDE 10 MILLIGRAM(S): 5 TABLET ORAL at 01:55

## 2024-04-11 RX ADMIN — Medication 975 MILLIGRAM(S): at 17:46

## 2024-04-11 NOTE — PROGRESS NOTE ADULT - SUBJECTIVE AND OBJECTIVE BOX
CARMEN CORTEZ is a 49yo now POD#1 s/p SELMA BSO, abdominoplasty    S:    Overnight pt was put on 2L NC for desaturation. Pt states that she feels like she can't get a full breath in. She is using the incentive spirometer. She states that she is afraid to cough due to the extent of her surgery  Patient was seen and examined at bedside. Pain is controlled with PRN medication   Tolerating regular diet, denies N/V.   No ambulation yet  - flatus/-BM/- voiding    O:   T(C): 36.7 (04-11-24 @ 04:35), Max: 37.3 (04-10-24 @ 12:05)  HR: 84 (04-11-24 @ 04:35) (84 - 115)  BP: 101/61 (04-11-24 @ 04:35) (101/61 - 122/93)  RR: 18 (04-11-24 @ 04:35) (16 - 20)  SpO2: 96% (04-11-24 @ 04:35) (91% - 97%)    Gen: NAD, AOx3  CV: RRR  Pulm: CTAB  Abdomen: soft, nondistended, appropriately tender  Incision: clean dry and intact at transverse incision and umbilicus  Extrem: no calf tenderness or edema     Labs:                         9.3    13.79 )-----------( 219      ( 11 Apr 2024 04:23 )             28.5     04-11    138  |  103  |  9.6  ----------------------------<  106<H>  4.2   |  22.0  |  0.85    Ca    8.0<L>      11 Apr 2024 04:23    TPro  5.6<L>  /  Alb  3.2<L>  /  TBili  0.3<L>  /  DBili  x   /  AST  21  /  ALT  14  /  AlkPhos  64  04-11      04-10-24 @ 07:01  -  04-11-24 @ 06:50  --------------------------------------------------------  IN: 1175 mL / OUT: 1345 mL / NET: -170 mL            CARMEN CORTEZ is a 47yo now POD#1 s/p SELMA BSO, abdominoplasty    S:    Overnight pt was put on 2L NC for desaturation. Pt states that she feels like she can't get a full breath in. She is using the incentive spirometer. She states that she is afraid to cough due to the extent of her surgery  Patient was seen and examined at bedside. Pain is controlled with PRN medication   Tolerating regular diet, denies N/V.   No ambulation yet  - flatus/-BM/- voiding    O:   T(C): 36.7 (04-11-24 @ 04:35), Max: 37.3 (04-10-24 @ 12:05)  HR: 84 (04-11-24 @ 04:35) (84 - 115)  BP: 101/61 (04-11-24 @ 04:35) (101/61 - 122/93)  RR: 18 (04-11-24 @ 04:35) (16 - 20)  SpO2: 96% (04-11-24 @ 04:35) (91% - 97%)    Gen: NAD, AOx3  CV: RRR  Pulm: CTAB  Abdomen: soft, nondistended, appropriately tender  Incision: clean dry and intact at transverse incision and umbilicus. Surya drains bilaterally with minimal serosanguinous fluid  Extrem: no calf tenderness or edema     Labs:                         9.3    13.79 )-----------( 219      ( 11 Apr 2024 04:23 )             28.5     04-11    138  |  103  |  9.6  ----------------------------<  106<H>  4.2   |  22.0  |  0.85    Ca    8.0<L>      11 Apr 2024 04:23    TPro  5.6<L>  /  Alb  3.2<L>  /  TBili  0.3<L>  /  DBili  x   /  AST  21  /  ALT  14  /  AlkPhos  64  04-11      04-10-24 @ 07:01  -  04-11-24 @ 06:50  --------------------------------------------------------  IN: 1175 mL / OUT: 1345 mL / NET: -170 mL

## 2024-04-11 NOTE — DISCHARGE NOTE PROVIDER - CARE PROVIDERS DIRECT ADDRESSES
[email or web address],DirectAddress_Unknown

## 2024-04-11 NOTE — DISCHARGE NOTE PROVIDER - ATTENDING ATTESTATION STATEMENT
----- Message from Mino Kimball MD sent at 3/7/2024  3:13 PM CST -----  Regarding: ED follow up  Here POD 10 s/p urethrecetomy for serosanguinous perineal wound drainage.     I have personally seen and examined the patient. I have collaborated with and supervised the

## 2024-04-11 NOTE — DISCHARGE NOTE PROVIDER - HOSPITAL COURSE
Patient underwent an open hysterectomy and bilateral salpingoophorectomy, patient also underwent abdominoplasty. Post-operative course was complicated by low urine output and tachycardia, patient was asymptomatic. She was hydrated and placed on telemetry and these problems spontaneously resolved. Pain is well controlled with PRN medication. She has no difficulty with ambulation, voiding, or PO intake. Lab values and vital signs are within normal limits prior to discharge.

## 2024-04-11 NOTE — PROGRESS NOTE ADULT - SUBJECTIVE AND OBJECTIVE BOX
POD # 1    Patient seen this AM ~~06:45    Patient resting comfortably in NAD  Afebrile  VSS  Abdomen soft  not tender  Dressing / STEVEN drain in place   Extrem No Homans    WBC 13.79  H/H 9.3/28.5  Platel. 219  B Pos     Patient s/p BRIANNA / BSO,, Abdominoplasty  Continue post op care    asymptomatic anemia secondary to acute blood loss at surgery

## 2024-04-11 NOTE — DISCHARGE NOTE PROVIDER - NSDCFUSCHEDAPPT_GEN_ALL_CORE_FT
Radha Hermosillo  [facility]  [facility] [address]  Scheduled Appointment: 04/16/2024

## 2024-04-11 NOTE — DISCHARGE NOTE PROVIDER - NSDCMRMEDTOKEN_GEN_ALL_CORE_FT
acetaminophen 325 mg oral tablet: 2 tab(s) orally every 6 hours as needed for Mild Pain (1 - 3), Moderate Pain (4 - 6)  ibuprofen 600 mg oral tablet: 1 tab(s) orally every 6 hours as needed for Mild Pain (1 - 3), Moderate Pain (4 - 6)  oxyCODONE 5 mg oral tablet: 1 tab(s) orally every 6 hours as needed for Moderate Pain (4 - 6) MDD: 4 pillls

## 2024-04-11 NOTE — PROGRESS NOTE ADULT - ASSESSMENT
A/P: 47yo now POD#1 s/p SELMA BSO, abdominoplasty  Gen: VSS  Neuro: Pain well controlled on current regimen  CV: Patient was tachcardic to 110s post op, EKG with possible LVH but otherwise normal. Tachycardia now resolved  Pulm: incentive spirometer use encouraged. Continue 2L NC per patient's comfort  GI: Bowel sounds/function normal, tolerating PO diet  : Jaime removed, pending TOV. UOP originally low post-op, picked up overnight and now sufficient  Heme: AM labs pending  DVT ppx: ambulation encouraged, SCDs when in bed, lovenox  Dispo: continue inpatient care

## 2024-04-11 NOTE — DISCHARGE NOTE PROVIDER - CARE PROVIDER_API CALL
Radha Hermosillo  Obstetrics and Gynecology  [address] [place] [address]  Phone: [phone]  Fax: [phone]  Follow Up Time: 2 weeks    Shady Bailey  Plastic Surgery  [address], [facility] [address]  [place] [address]  Phone: [phone]  Fax: [phone]  Follow Up Time:

## 2024-04-11 NOTE — DISCHARGE NOTE PROVIDER - NSDCCPCAREPLAN_GEN_ALL_CORE_FT
PRINCIPAL DISCHARGE DIAGNOSIS  Diagnosis: S/P abdominal supracervical subtotal hysterectomy  Assessment and Plan of Treatment:       SECONDARY DISCHARGE DIAGNOSES  Diagnosis: S/P abdominoplasty  Assessment and Plan of Treatment:

## 2024-04-11 NOTE — DISCHARGE NOTE PROVIDER - NSDCFUADDINST_GEN_ALL_CORE_FT
May walk and climb stairs, no vigorous activity, do not lift anything greater than 10lbs, nothing per vagina x 6 weeks, do not drive while on pain medication.  Please contact your provider for any pain uncontrolled by medication, excessive bleeding or Fever>100.4    See Dr Bailey within the next week for drain removal

## 2024-04-12 ENCOUNTER — RESULT REVIEW (OUTPATIENT)
Age: 49
End: 2024-04-12

## 2024-04-12 PROBLEM — G43.909 MIGRAINE, UNSPECIFIED, NOT INTRACTABLE, WITHOUT STATUS MIGRAINOSUS: Chronic | Status: ACTIVE | Noted: 2024-03-21

## 2024-04-12 LAB
ALBUMIN SERPL ELPH-MCNC: 3.1 G/DL — LOW (ref 3.3–5.2)
ALP SERPL-CCNC: 61 U/L — SIGNIFICANT CHANGE UP (ref 40–120)
ALT FLD-CCNC: 12 U/L — SIGNIFICANT CHANGE UP
ANION GAP SERPL CALC-SCNC: 10 MMOL/L — SIGNIFICANT CHANGE UP (ref 5–17)
APTT BLD: 27.9 SEC — SIGNIFICANT CHANGE UP (ref 24.5–35.6)
AST SERPL-CCNC: 20 U/L — SIGNIFICANT CHANGE UP
BILIRUB SERPL-MCNC: <0.2 MG/DL — LOW (ref 0.4–2)
BUN SERPL-MCNC: 9.8 MG/DL — SIGNIFICANT CHANGE UP (ref 8–20)
CALCIUM SERPL-MCNC: 7.9 MG/DL — LOW (ref 8.4–10.5)
CHLORIDE SERPL-SCNC: 103 MMOL/L — SIGNIFICANT CHANGE UP (ref 96–108)
CO2 SERPL-SCNC: 25 MMOL/L — SIGNIFICANT CHANGE UP (ref 22–29)
CREAT SERPL-MCNC: 0.83 MG/DL — SIGNIFICANT CHANGE UP (ref 0.5–1.3)
D DIMER BLD IA.RAPID-MCNC: 328 NG/ML DDU — HIGH
EGFR: 86 ML/MIN/1.73M2 — SIGNIFICANT CHANGE UP
GLUCOSE SERPL-MCNC: 85 MG/DL — SIGNIFICANT CHANGE UP (ref 70–99)
HCT VFR BLD CALC: 27.8 % — LOW (ref 34.5–45)
HGB BLD-MCNC: 9 G/DL — LOW (ref 11.5–15.5)
INR BLD: 0.93 RATIO — SIGNIFICANT CHANGE UP (ref 0.85–1.18)
MAGNESIUM SERPL-MCNC: 1.9 MG/DL — SIGNIFICANT CHANGE UP (ref 1.6–2.6)
MCHC RBC-ENTMCNC: 26.9 PG — LOW (ref 27–34)
MCHC RBC-ENTMCNC: 32.4 GM/DL — SIGNIFICANT CHANGE UP (ref 32–36)
MCV RBC AUTO: 83 FL — SIGNIFICANT CHANGE UP (ref 80–100)
NT-PROBNP SERPL-SCNC: 214 PG/ML — SIGNIFICANT CHANGE UP (ref 0–300)
PLATELET # BLD AUTO: 205 K/UL — SIGNIFICANT CHANGE UP (ref 150–400)
POTASSIUM SERPL-MCNC: 3.8 MMOL/L — SIGNIFICANT CHANGE UP (ref 3.5–5.3)
POTASSIUM SERPL-SCNC: 3.8 MMOL/L — SIGNIFICANT CHANGE UP (ref 3.5–5.3)
PROT SERPL-MCNC: 5.6 G/DL — LOW (ref 6.6–8.7)
PROTHROM AB SERPL-ACNC: 10.3 SEC — SIGNIFICANT CHANGE UP (ref 9.5–13)
RBC # BLD: 3.35 M/UL — LOW (ref 3.8–5.2)
RBC # FLD: 14 % — SIGNIFICANT CHANGE UP (ref 10.3–14.5)
SODIUM SERPL-SCNC: 138 MMOL/L — SIGNIFICANT CHANGE UP (ref 135–145)
TROPONIN T, HIGH SENSITIVITY RESULT: <6 NG/L — SIGNIFICANT CHANGE UP (ref 0–51)
WBC # BLD: 10.01 K/UL — SIGNIFICANT CHANGE UP (ref 3.8–10.5)
WBC # FLD AUTO: 10.01 K/UL — SIGNIFICANT CHANGE UP (ref 3.8–10.5)

## 2024-04-12 PROCEDURE — 99223 1ST HOSP IP/OBS HIGH 75: CPT

## 2024-04-12 PROCEDURE — 93010 ELECTROCARDIOGRAM REPORT: CPT

## 2024-04-12 PROCEDURE — 93306 TTE W/DOPPLER COMPLETE: CPT | Mod: 26

## 2024-04-12 PROCEDURE — 93970 EXTREMITY STUDY: CPT | Mod: 26

## 2024-04-12 PROCEDURE — 71046 X-RAY EXAM CHEST 2 VIEWS: CPT | Mod: 26

## 2024-04-12 RX ORDER — SENNA PLUS 8.6 MG/1
2 TABLET ORAL AT BEDTIME
Refills: 0 | Status: DISCONTINUED | OUTPATIENT
Start: 2024-04-12 | End: 2024-04-13

## 2024-04-12 RX ORDER — ENOXAPARIN SODIUM 100 MG/ML
80 INJECTION SUBCUTANEOUS ONCE
Refills: 0 | Status: COMPLETED | OUTPATIENT
Start: 2024-04-12 | End: 2024-04-12

## 2024-04-12 RX ORDER — DIPHENHYDRAMINE HCL 50 MG
50 CAPSULE ORAL ONCE
Refills: 0 | Status: COMPLETED | OUTPATIENT
Start: 2024-04-12 | End: 2024-04-13

## 2024-04-12 RX ADMIN — Medication 975 MILLIGRAM(S): at 12:21

## 2024-04-12 RX ADMIN — POLYETHYLENE GLYCOL 3350 17 GRAM(S): 17 POWDER, FOR SOLUTION ORAL at 12:22

## 2024-04-12 RX ADMIN — OXYCODONE HYDROCHLORIDE 10 MILLIGRAM(S): 5 TABLET ORAL at 03:11

## 2024-04-12 RX ADMIN — SENNA PLUS 2 TABLET(S): 8.6 TABLET ORAL at 22:05

## 2024-04-12 RX ADMIN — Medication 500 MILLIGRAM(S): at 12:21

## 2024-04-12 RX ADMIN — Medication 40 MILLIGRAM(S): at 20:53

## 2024-04-12 RX ADMIN — FAMOTIDINE 20 MILLIGRAM(S): 10 INJECTION INTRAVENOUS at 12:21

## 2024-04-12 RX ADMIN — Medication 325 MILLIGRAM(S): at 12:21

## 2024-04-12 RX ADMIN — Medication 975 MILLIGRAM(S): at 19:37

## 2024-04-12 RX ADMIN — Medication 1 MILLIGRAM(S): at 12:21

## 2024-04-12 RX ADMIN — Medication 975 MILLIGRAM(S): at 13:08

## 2024-04-12 RX ADMIN — ENOXAPARIN SODIUM 80 MILLIGRAM(S): 100 INJECTION SUBCUTANEOUS at 22:04

## 2024-04-12 RX ADMIN — Medication 975 MILLIGRAM(S): at 06:03

## 2024-04-12 RX ADMIN — Medication 975 MILLIGRAM(S): at 00:12

## 2024-04-12 RX ADMIN — Medication 1 TABLET(S): at 12:21

## 2024-04-12 RX ADMIN — Medication 975 MILLIGRAM(S): at 05:03

## 2024-04-12 RX ADMIN — OXYCODONE HYDROCHLORIDE 10 MILLIGRAM(S): 5 TABLET ORAL at 04:11

## 2024-04-12 RX ADMIN — Medication 975 MILLIGRAM(S): at 18:22

## 2024-04-12 NOTE — CHART NOTE - NSCHARTNOTEFT_GEN_A_CORE
Patient reassessed at bedside.  She states that she felt tired and unwell this morning, with some nausea, but is feeling better now. Still has feeling of not being able to take in a full breath.  Ambulating around the room to and from the bathroom only.   Tolerating PO diet  Endorses soreness but is not complaining of pain  She denies chest pain, palpitations, calf pain    Vital Signs Last 24 Hrs  T(C): 36.8 (12 Apr 2024 12:26), Max: 36.9 (11 Apr 2024 22:17)  T(F): 98.3 (12 Apr 2024 12:26), Max: 98.4 (11 Apr 2024 22:17)  HR: 95 (12 Apr 2024 12:26) (74 - 95)  BP: 110/75 (12 Apr 2024 12:26) (103/66 - 127/82)  RR: 18 (12 Apr 2024 12:26) (18 - 18)  SpO2: 98% (12 Apr 2024 12:26) (92% - 99%)      POD 2 from supracervical hysterectomy, BSO, abdominoplasty.  -CXR this morning for persistent SOB, pending formal read. Evidence of small L pleural effusion  -Discussed that she has not seen a PCP recently, she had gone to urgent care for pre surgical clearance and had an EKG with abnormalities at that time.   -Medicine consult placed: recommended D-dimer, coags, troponins, BNP. Repeat EKG and echo pending. Appreciate recommendations  -Patient otherwise continues to meet post-op milestones

## 2024-04-12 NOTE — PROGRESS NOTE ADULT - SUBJECTIVE AND OBJECTIVE BOX
POD # 2    Patient resting comfortably in NAD  Afebrile VSS  Patient c/o " cant take deep breath  Abdomen  soft  not tender  Exrem No Homans    Voiding +  Flatus +  Pulse O2 Nl    WBC 10.1  H/h 9/27.8  Platelet 305    Medicine consult requested  Upon clearance  --- DC to home

## 2024-04-12 NOTE — CONSULT NOTE ADULT - SUBJECTIVE AND OBJECTIVE BOX
47 y/o , LMP 3/10/24 female with Hx of borderline hypothyroidism, uterine fibroids, heavy periods, and uterine wall thickening, She had a discussion with the surgeon to have a hysterectomy due to hx of large fibroids that she is also thinking of having done, she has   crampy pain during menstruation for over 2 years, she came in here for elective Total Abdominal Hysterectomy with B/l Salpingectomy, anterior adjacent tissue rearrangement, abdominal wall reconstruction on 4/10/24 with MD Hermosillo s/p procedure, post operative she was noted to have SOB, EKG done as well XR, Xray shows left small pleural effusion, she denies Hx of CHF, she has no cough, medicine consulted for medical management of SOB       Allergies:  	shellfish: Food, Anaphylaxis  	penicillin: Drug, Hives  	iodine: Drug, Angioedema      PAST MEDICAL HISTORY:  Hypothyroid     Migraine.     PAST SURGICAL HISTORY:  Deviated septum     H/O dilation and curettage     S/P Mohs surgery for basal cell carcinoma.     FAMILY HISTORY:  Father  Still living? Unknown  FH: HTN (hypertension), Age at diagnosis: Age Unknown.        Social History:   Not a smoker, drinker or using any drugs     Home Medications:   * Patient Currently Takes Medications as of 21-Mar-2024 08:11 documented in Structured Notes  · 	ibuprofen 600 mg oral tablet: Last Dose Taken:  , 1 tab(s) orally every 6 hours as needed for Mild Pain (1 - 3), Moderate Pain (4 - 6)  · 	acetaminophen 325 mg oral tablet: Last Dose Taken:  , 2 tab(s) orally every 6 hours as needed for Mild Pain (1 - 3), Moderate Pain (4 - 6)                LABS:                        9.0    10.01 )-----------( 205      ( 2024 06:23 )             27.8     -    138  |  103  |  9.8  ----------------------------<  85  3.8   |  25.0  |  0.83    Ca    7.9<L>      2024 06:23  Mg     1.9         TPro  5.6<L>  /  Alb  3.1<L>  /  TBili  <0.2<L>  /  DBili  x   /  AST  20  /  ALT  12  /  AlkPhos  61  -    PT/INR - ( 2024 14:34 )   PT: 10.3 sec;   INR: 0.93 ratio         PTT - ( 2024 14:34 )  PTT:27.9 sec          I&O's Summary    2024 07:01  -  2024 07:00  --------------------------------------------------------  IN: 0 mL / OUT: 1600 mL / NET: -1600 mL    2024 07:01  -  2024 15:22  --------------------------------------------------------  IN: 480 mL / OUT: 510 mL / NET: -30 mL        MEDICATIONS  (STANDING):  acetaminophen     Tablet .. 975 milliGRAM(s) Oral every 6 hours  acetaminophen     Tablet .. 975 milliGRAM(s) Oral every 6 hours  ascorbic acid 500 milliGRAM(s) Oral daily  enoxaparin Injectable 40 milliGRAM(s) SubCutaneous every 24 hours  famotidine    Tablet 20 milliGRAM(s) Oral daily  ferrous    sulfate 325 milliGRAM(s) Oral daily  folic acid 1 milliGRAM(s) Oral daily  gentamicin   IVPB 330 milliGRAM(s) IV Intermittent once  ibuprofen  Tablet. 600 milliGRAM(s) Oral every 6 hours  influenza   Vaccine 0.5 milliLiter(s) IntraMuscular once  polyethylene glycol 3350 17 Gram(s) Oral daily  prenatal multivitamin 1 Tablet(s) Oral daily    MEDICATIONS  (PRN):  ondansetron    Tablet 8 milliGRAM(s) Oral every 8 hours PRN Nausea and/or Vomiting  oxyCODONE    IR 10 milliGRAM(s) Oral every 4 hours PRN Severe Pain (7 - 10)  oxyCODONE    IR 5 milliGRAM(s) Oral every 3 hours PRN Moderate Pain (4 - 6)  simethicone 80 milliGRAM(s) Chew every 6 hours PRN Gas           49 y/o , LMP 3/10/24 female with Hx of borderline hypothyroidism, uterine fibroids, heavy periods, and uterine wall thickening, She had a discussion with the surgeon to have a hysterectomy due to hx of large fibroids that she is also thinking of having done, she has crampy pain during menstruation for over 2 years, she came in here for elective Total Abdominal Hysterectomy with B/l Salpingectomy, anterior adjacent tissue rearrangement, abdominal wall reconstruction on 4/10/24 with MD Hermosillo s/p procedure, post operative she was noted to have SOB, EKG done as well XR, Xray shows left small pleural effusion, she denies Hx of CHF, she has no cough, medicine consulted for medical management of SOB       Allergies:  	shellfish: Food, Anaphylaxis  	penicillin: Drug, Hives  	iodine: Drug, Angioedema      PAST MEDICAL HISTORY:  Hypothyroid     Migraine.     PAST SURGICAL HISTORY:  Deviated septum     H/O dilation and curettage     S/P Mohs surgery for basal cell carcinoma.     FAMILY HISTORY:  Father  Still living? Unknown  FH: HTN (hypertension), Age at diagnosis: Age Unknown.        Social History:   Not a smoker, drinker or using any drugs     Home Medications:   * Patient Currently Takes Medications as of 21-Mar-2024 08:11 documented in Structured Notes  · 	ibuprofen 600 mg oral tablet: Last Dose Taken:  , 1 tab(s) orally every 6 hours as needed for Mild Pain (1 - 3), Moderate Pain (4 - 6)  · 	acetaminophen 325 mg oral tablet: Last Dose Taken:  , 2 tab(s) orally every 6 hours as needed for Mild Pain (1 - 3), Moderate Pain (4 - 6)        Vital Signs Last 24 Hrs  T(C): 36.9 (2024 16:57), Max: 36.9 (2024 22:17)  T(F): 98.4 (2024 16:57), Max: 98.4 (2024 22:17)  HR: 82 (2024 16:57) (74 - 95)  BP: 131/81 (2024 16:57) (103/66 - 131/81)  RR: 18 (2024 16:57) (18 - 18)  SpO2: 94% (2024 16:57) (92% - 99%)    Parameters below as of 2024 16:57  Patient On (Oxygen Delivery Method): room air        PHYSICAL EXAM:    GENERAL: Middle age female looking comfortable    HEENT: PERRL, +EOMI  NECK: soft, Supple, No JVD   CHEST/LUNG: Clear to auscultate bilaterally; No wheezing  HEART: S1S2+, Regular rate and rhythm; No murmurs  ABDOMEN: Soft, Nontender, Nondistended; surgical wounds with dressings on,  Bowel sounds present, she has drain in place   EXTREMITIES:  1+ Peripheral Pulses, No edema  SKIN: No rashes or lesions  NEURO: AAOX3  PSYCH: normal mood          LABS:                        9.0    10.01 )-----------( 205      ( 2024 06:23 )             27.8     04-12    138  |  103  |  9.8  ----------------------------<  85  3.8   |  25.0  |  0.83    Ca    7.9<L>      2024 06:23  Mg     1.9     12    TPro  5.6<L>  /  Alb  3.1<L>  /  TBili  <0.2<L>  /  DBili  x   /  AST  20  /  ALT  12  /  AlkPhos  61  04-12    PT/INR - ( 2024 14:34 )   PT: 10.3 sec;   INR: 0.93 ratio         PTT - ( 2024 14:34 )  PTT:27.9 sec          I&O's Summary    2024 07:01  -  2024 07:00  --------------------------------------------------------  IN: 0 mL / OUT: 1600 mL / NET: -1600 mL    2024 07:01  -  2024 15:22  --------------------------------------------------------  IN: 480 mL / OUT: 510 mL / NET: -30 mL        MEDICATIONS  (STANDING):  acetaminophen     Tablet .. 975 milliGRAM(s) Oral every 6 hours  acetaminophen     Tablet .. 975 milliGRAM(s) Oral every 6 hours  ascorbic acid 500 milliGRAM(s) Oral daily  enoxaparin Injectable 40 milliGRAM(s) SubCutaneous every 24 hours  famotidine    Tablet 20 milliGRAM(s) Oral daily  ferrous    sulfate 325 milliGRAM(s) Oral daily  folic acid 1 milliGRAM(s) Oral daily  gentamicin   IVPB 330 milliGRAM(s) IV Intermittent once  ibuprofen  Tablet. 600 milliGRAM(s) Oral every 6 hours  influenza   Vaccine 0.5 milliLiter(s) IntraMuscular once  polyethylene glycol 3350 17 Gram(s) Oral daily  prenatal multivitamin 1 Tablet(s) Oral daily    MEDICATIONS  (PRN):  ondansetron    Tablet 8 milliGRAM(s) Oral every 8 hours PRN Nausea and/or Vomiting  oxyCODONE    IR 10 milliGRAM(s) Oral every 4 hours PRN Severe Pain (7 - 10)  oxyCODONE    IR 5 milliGRAM(s) Oral every 3 hours PRN Moderate Pain (4 - 6)  simethicone 80 milliGRAM(s) Chew every 6 hours PRN Gas

## 2024-04-12 NOTE — PROGRESS NOTE ADULT - SUBJECTIVE AND OBJECTIVE BOX
CARMEN CORTEZ is a 47yo now POD#2 s/p SELMA BSO, abdominoplasty    S:     Pt continues to state that she feels like she can't get a full breath in. She was weaned off of O2 cannula yesterday afternoon. She is using the incentive spirometer. She states that she is afraid to cough due to the extent of her surgery  Patient was seen and examined at bedside. Pain is controlled with PRN medication   Tolerating regular diet, denies N/V.   No ambulation yet  + flatus/-BM/+ voiding    O:   Vital Signs Last 24 Hrs  T(C): 36.3 (12 Apr 2024 05:00), Max: 36.9 (11 Apr 2024 14:30)  T(F): 97.3 (12 Apr 2024 05:00), Max: 98.4 (11 Apr 2024 14:30)  HR: 75 (12 Apr 2024 05:00) (72 - 87)  BP: 103/66 (12 Apr 2024 05:00) (103/66 - 138/79)  BP(mean): --  RR: 18 (12 Apr 2024 05:00) (18 - 18)  SpO2: 99% (12 Apr 2024 05:00) (92% - 99%)        Gen: NAD, AOx3  CV: RRR  Pulm: CTAB  Abdomen: soft, nondistended, appropriately tender  Incision: clean dry and intact at transverse incision and umbilicus. Surya drains bilaterally with minimal serosanguinous fluid  Extrem: no calf tenderness or edema     Labs:                         9.3    13.79 )-----------( 219      ( 11 Apr 2024 04:23 )             28.5     04-11    138  |  103  |  9.6  ----------------------------<  106<H>  4.2   |  22.0  |  0.85    Ca    8.0<L>      11 Apr 2024 04:23    TPro  5.6<L>  /  Alb  3.2<L>  /  TBili  0.3<L>  /  DBili  x   /  AST  21  /  ALT  14  /  AlkPhos  64  04-11      04-10-24 @ 07:01  -  04-11-24 @ 06:50  --------------------------------------------------------  IN: 1175 mL / OUT: 1345 mL / NET: -170 mL

## 2024-04-12 NOTE — PROGRESS NOTE ADULT - ASSESSMENT
A/P: 49yo now POD2 s/p SELMA BSO, abdominoplasty  Gen: VSS  Neuro: Pain well controlled on current regimen  CV: Patient was tachycardic to 110s post op, EKG with possible LVH but otherwise normal. Tachycardia now resolved  Pulm: incentive spirometer use encouraged O2 sat overnight wnl, vitals otherwise wnl. Pt continues to complain of SOB. Consider medicine consult today  GI: Bowel sounds/function normal, tolerating PO diet  : UOP originally low post-op, picked up overnight and now sufficient. Voiding spontaneously  Heme:  11.0>9.3, pending AM labs  DVT ppx: ambulation encouraged, SCDs when in bed, lovenox  Dispo: continue inpatient care

## 2024-04-12 NOTE — CONSULT NOTE ADULT - ASSESSMENT
49 y/o , LMP 3/10/24 female with Hx of borderline hypothyroidism, uterine fibroids, heavy periods, and uterine wall thickening, She had a discussion with the surgeon to have a hysterectomy due to hx of large fibroids that she is also thinking of having done, she has   crampy pain during menstruation for over 2 years, she came in here for elective Total Abdominal Hysterectomy with B/l Salpingectomy, anterior adjacent tissue rearrangement, abdominal wall reconstruction on 4/10/24 with MD Hermosillo s/p procedure, post operative she was noted to have SOB, EKG done as well XR, Xray shows left small pleural effusion, she denies Hx of CHF, she has no cough, medicine consulted for medical management of SOB.       Post op SOB:   she is satting well on RA   EKG shows some t inversion on lead III, flipped t waves in V2, V3 and V4  will repeat EKG, will do cardiac monitor  will get trops, BNP and TTE   will get D dimers as well  she denies any chest pain  will encourage IS  CXR shows possible small left pleural effusion  if D dimers are elevated will do CTA to r/o PE    Uterine fibroids s/p  Total Abdominal Hysterectomy with B/l Salpingectomy, anterior adjacent tissue rearrangement, abdominal wall reconstruction on 04/10  Pain meds   wound care  bowel meds   biopsy to follow   further management as per Primary team     Hypothyroidism: not on any meds, will get TSH    Prediabetes: Hb a1c is 5.8, counselled for low carb diet and excercise      Acute blood loss anemia due to procedure: Iron supplement, will monitor CBC        49 y/o , LMP 3/10/24 female with Hx of borderline hypothyroidism, uterine fibroids, heavy periods, and uterine wall thickening, She had a discussion with the surgeon to have a hysterectomy due to hx of large fibroids that she is also thinking of having done, she has   crampy pain during menstruation for over 2 years, she came in here for elective Total Abdominal Hysterectomy with B/l Salpingectomy, anterior adjacent tissue rearrangement, abdominal wall reconstruction on 4/10/24 with MD Hermosillo s/p procedure, post operative she was noted to have SOB, EKG done as well XR, Xray shows left small pleural effusion, she denies Hx of CHF, she has no cough, medicine consulted for medical management of SOB.       Post op SOB:   she is satting well on RA   EKG shows some t inversion on lead III, flipped t waves in V2, V3 and V4  will repeat EKG, will do cardiac monitor, cycle trops   will get trops, BNP and TTE   will get D dimers as well  she denies any chest pain  will encourage IS  CXR shows possible small left pleural effusion  if D dimers are elevated will do CTA to r/o PE  will do US doppler     Uterine fibroids s/p  Total Abdominal Hysterectomy with B/l Salpingectomy, anterior adjacent tissue rearrangement, abdominal wall reconstruction on 04/10  Pain meds   wound care  bowel meds   biopsy to follow   further management as per Primary team     Hypothyroidism: not on any meds, will get TSH    Prediabetes: Hb a1c is 5.8, counselled for low carb diet and excercise      Acute blood loss anemia due to procedure: Iron supplement, will monitor CBC     constipation: bowel meds.     Plan of care discussed with Gyn team

## 2024-04-13 ENCOUNTER — TRANSCRIPTION ENCOUNTER (OUTPATIENT)
Age: 49
End: 2024-04-13

## 2024-04-13 VITALS
RESPIRATION RATE: 18 BRPM | TEMPERATURE: 98 F | OXYGEN SATURATION: 93 % | DIASTOLIC BLOOD PRESSURE: 81 MMHG | SYSTOLIC BLOOD PRESSURE: 122 MMHG | HEART RATE: 77 BPM

## 2024-04-13 LAB
ALBUMIN SERPL ELPH-MCNC: 3.5 G/DL — SIGNIFICANT CHANGE UP (ref 3.3–5.2)
ALP SERPL-CCNC: 79 U/L — SIGNIFICANT CHANGE UP (ref 40–120)
ALT FLD-CCNC: 15 U/L — SIGNIFICANT CHANGE UP
ANION GAP SERPL CALC-SCNC: 12 MMOL/L — SIGNIFICANT CHANGE UP (ref 5–17)
AST SERPL-CCNC: 23 U/L — SIGNIFICANT CHANGE UP
BILIRUB SERPL-MCNC: 0.2 MG/DL — LOW (ref 0.4–2)
BLD GP AB SCN SERPL QL: SIGNIFICANT CHANGE UP
BUN SERPL-MCNC: 8.5 MG/DL — SIGNIFICANT CHANGE UP (ref 8–20)
CALCIUM SERPL-MCNC: 9.2 MG/DL — SIGNIFICANT CHANGE UP (ref 8.4–10.5)
CHLORIDE SERPL-SCNC: 104 MMOL/L — SIGNIFICANT CHANGE UP (ref 96–108)
CO2 SERPL-SCNC: 24 MMOL/L — SIGNIFICANT CHANGE UP (ref 22–29)
CREAT SERPL-MCNC: 0.75 MG/DL — SIGNIFICANT CHANGE UP (ref 0.5–1.3)
EGFR: 97 ML/MIN/1.73M2 — SIGNIFICANT CHANGE UP
GLUCOSE SERPL-MCNC: 124 MG/DL — HIGH (ref 70–99)
HCT VFR BLD CALC: 32 % — LOW (ref 34.5–45)
HGB BLD-MCNC: 10.3 G/DL — LOW (ref 11.5–15.5)
MCHC RBC-ENTMCNC: 26.6 PG — LOW (ref 27–34)
MCHC RBC-ENTMCNC: 32.2 GM/DL — SIGNIFICANT CHANGE UP (ref 32–36)
MCV RBC AUTO: 82.7 FL — SIGNIFICANT CHANGE UP (ref 80–100)
PLATELET # BLD AUTO: 247 K/UL — SIGNIFICANT CHANGE UP (ref 150–400)
POTASSIUM SERPL-MCNC: 4.9 MMOL/L — SIGNIFICANT CHANGE UP (ref 3.5–5.3)
POTASSIUM SERPL-SCNC: 4.9 MMOL/L — SIGNIFICANT CHANGE UP (ref 3.5–5.3)
PROT SERPL-MCNC: 6.8 G/DL — SIGNIFICANT CHANGE UP (ref 6.6–8.7)
RBC # BLD: 3.87 M/UL — SIGNIFICANT CHANGE UP (ref 3.8–5.2)
RBC # FLD: 13.7 % — SIGNIFICANT CHANGE UP (ref 10.3–14.5)
SODIUM SERPL-SCNC: 140 MMOL/L — SIGNIFICANT CHANGE UP (ref 135–145)
TROPONIN T, HIGH SENSITIVITY RESULT: <6 NG/L — SIGNIFICANT CHANGE UP (ref 0–51)
WBC # BLD: 9.66 K/UL — SIGNIFICANT CHANGE UP (ref 3.8–10.5)
WBC # FLD AUTO: 9.66 K/UL — SIGNIFICANT CHANGE UP (ref 3.8–10.5)

## 2024-04-13 PROCEDURE — 80053 COMPREHEN METABOLIC PANEL: CPT

## 2024-04-13 PROCEDURE — 93005 ELECTROCARDIOGRAM TRACING: CPT

## 2024-04-13 PROCEDURE — 71275 CT ANGIOGRAPHY CHEST: CPT | Mod: 26

## 2024-04-13 PROCEDURE — 84484 ASSAY OF TROPONIN QUANT: CPT

## 2024-04-13 PROCEDURE — 86850 RBC ANTIBODY SCREEN: CPT

## 2024-04-13 PROCEDURE — 88305 TISSUE EXAM BY PATHOLOGIST: CPT

## 2024-04-13 PROCEDURE — 71275 CT ANGIOGRAPHY CHEST: CPT | Mod: MC

## 2024-04-13 PROCEDURE — 86900 BLOOD TYPING SEROLOGIC ABO: CPT

## 2024-04-13 PROCEDURE — 83880 ASSAY OF NATRIURETIC PEPTIDE: CPT

## 2024-04-13 PROCEDURE — 36415 COLL VENOUS BLD VENIPUNCTURE: CPT

## 2024-04-13 PROCEDURE — 93970 EXTREMITY STUDY: CPT

## 2024-04-13 PROCEDURE — 83735 ASSAY OF MAGNESIUM: CPT

## 2024-04-13 PROCEDURE — C9399: CPT

## 2024-04-13 PROCEDURE — 93306 TTE W/DOPPLER COMPLETE: CPT

## 2024-04-13 PROCEDURE — 85610 PROTHROMBIN TIME: CPT

## 2024-04-13 PROCEDURE — 71046 X-RAY EXAM CHEST 2 VIEWS: CPT

## 2024-04-13 PROCEDURE — 88307 TISSUE EXAM BY PATHOLOGIST: CPT

## 2024-04-13 PROCEDURE — 85025 COMPLETE CBC W/AUTO DIFF WBC: CPT

## 2024-04-13 PROCEDURE — 99232 SBSQ HOSP IP/OBS MODERATE 35: CPT

## 2024-04-13 PROCEDURE — 85730 THROMBOPLASTIN TIME PARTIAL: CPT

## 2024-04-13 PROCEDURE — 85027 COMPLETE CBC AUTOMATED: CPT

## 2024-04-13 PROCEDURE — 86901 BLOOD TYPING SEROLOGIC RH(D): CPT

## 2024-04-13 PROCEDURE — 85379 FIBRIN DEGRADATION QUANT: CPT

## 2024-04-13 RX ADMIN — OXYCODONE HYDROCHLORIDE 10 MILLIGRAM(S): 5 TABLET ORAL at 05:13

## 2024-04-13 RX ADMIN — Medication 50 MILLIGRAM(S): at 01:03

## 2024-04-13 RX ADMIN — Medication 1 MILLIGRAM(S): at 12:29

## 2024-04-13 RX ADMIN — Medication 1 TABLET(S): at 12:29

## 2024-04-13 RX ADMIN — Medication 500 MILLIGRAM(S): at 12:29

## 2024-04-13 RX ADMIN — OXYCODONE HYDROCHLORIDE 10 MILLIGRAM(S): 5 TABLET ORAL at 06:13

## 2024-04-13 RX ADMIN — Medication 975 MILLIGRAM(S): at 12:28

## 2024-04-13 RX ADMIN — Medication 325 MILLIGRAM(S): at 12:29

## 2024-04-13 RX ADMIN — FAMOTIDINE 20 MILLIGRAM(S): 10 INJECTION INTRAVENOUS at 12:29

## 2024-04-13 NOTE — PROGRESS NOTE ADULT - ASSESSMENT
A/P: 47yo now POD#3 s/p SELMA BSO, abdominoplasty  Gen: VSS  Neuro: Pain well controlled on current regimen  CV: Patient was tachycardic to 110s post op, EKG done at the time showing possible LVH. Tachycardia now resolved. Medicine consulted and recommended workup due to abnormal EKG. Echo, LE doppler, CT angio completed. All normal, no evidence of DVT or PE. Further recommendations appreciated. AM Troponin pending.   Pulm: incentive spirometer use encouraged O2 sat overnight wnl, vitals otherwise wnl.  GI: Bowel sounds/function normal, tolerating PO diet  :  Voiding spontaneously  Heme:  11.0>9.3 > 9.0, AM labs pending   DVT ppx: ambulation encouraged, SCDs when in bed, lovenox  Dispo: pending AM labs    to be d/w Dr. Hermosillo       A/P: 47yo now POD#3 s/p SELMA BSO, abdominoplasty  Gen: VSS  Neuro: Pain well controlled on current regimen  CV: Patient was tachycardic to 110s post op, EKG done at the time showing possible LVH. Tachycardia now resolved. Medicine consulted and recommended workup due to abnormal EKG. Echo, LE doppler, CT angio completed. All normal, no evidence of DVT or PE. Further recommendations appreciated. AM Troponin pending.   Pulm: incentive spirometer use encouraged O2 sat overnight wnl, vitals otherwise wnl. Patient symptomatically improved.   GI: Bowel sounds/function normal, tolerating PO diet  :  Voiding spontaneously  Heme:  11.0>9.3 > 9.0, AM labs pending   DVT ppx: ambulation encouraged, SCDs when in bed, lovenox  Dispo: pending AM labs, consider d/c today.     to be d/w Dr. Hermosillo

## 2024-04-13 NOTE — DISCHARGE NOTE NURSING/CASE MANAGEMENT/SOCIAL WORK - NSDCPEFALRISK_GEN_ALL_CORE
For information on Fall & Injury Prevention, visit: [email or web address] OR  [email or web address] OR  https://www.cdc.gov/steadi/patient.html

## 2024-04-13 NOTE — PROGRESS NOTE ADULT - SUBJECTIVE AND OBJECTIVE BOX
CARMEN CORTEZ is a 49yo now POD#3 s/p SELMA BSO, abdominoplasty    S:     Pt continues to state that she feels like she can't get a full breath in. She was weaned off of O2 cannula yesterday afternoon. She is using the incentive spirometer. She states that she is afraid to cough due to the extent of her surgery  Patient was seen and examined at bedside. Pain is controlled with PRN medication   Tolerating regular diet, denies N/V.   No ambulation yet  + flatus/-BM/+ voiding    O:   Vital Signs Last 24 Hrs  T(C): 36.8 (13 Apr 2024 00:30), Max: 36.9 (12 Apr 2024 16:57)  T(F): 98.2 (13 Apr 2024 00:30), Max: 98.4 (12 Apr 2024 16:57)  HR: 78 (13 Apr 2024 00:30) (78 - 95)  BP: 136/83 (13 Apr 2024 00:30) (110/75 - 136/83)  BP(mean): --  RR: 18 (13 Apr 2024 00:30) (18 - 18)  SpO2: 93% (13 Apr 2024 00:30) (92% - 98%)    Parameters below as of 13 Apr 2024 00:30  Patient On (Oxygen Delivery Method): room air        Gen: NAD, AOx3  CV: RRR  Pulm: CTAB  Abdomen: soft, nondistended, appropriately tender  Incision: clean dry and intact at transverse incision and umbilicus. Surya drains bilaterally with minimal serosanguinous fluid  Extrem: no calf tenderness or edema     Labs:                                     9.0    10.01 )-----------( 205      ( 12 Apr 2024 06:23 )             27.8     04-12    138  |  103  |  9.8  ----------------------------<  85  3.8   |  25.0  |  0.83    Ca    7.9<L>      12 Apr 2024 06:23  Mg     1.9     04-12    TPro  5.6<L>  /  Alb  3.1<L>  /  TBili  <0.2<L>  /  DBili  x   /  AST  20  /  ALT  12  /  AlkPhos  61  04-12        CARMEN CORTEZ is a 47yo now POD#3 s/p SELMA BSO, abdominoplasty    S:    Patient seen at bedside.  States much improvement and no longer SOB.   She denies CP, SOB, lightheadedness or dizziness.   Pain is controlled with PRN medication   Tolerating regular diet, denies N/V.   Has been ambulating with assistance.   + flatus/-BM/+ voiding    O:   Vital Signs Last 24 Hrs  T(C): 36.8 (13 Apr 2024 00:30), Max: 36.9 (12 Apr 2024 16:57)  T(F): 98.2 (13 Apr 2024 00:30), Max: 98.4 (12 Apr 2024 16:57)  HR: 78 (13 Apr 2024 00:30) (78 - 95)  BP: 136/83 (13 Apr 2024 00:30) (110/75 - 136/83)  BP(mean): --  RR: 18 (13 Apr 2024 00:30) (18 - 18)  SpO2: 93% (13 Apr 2024 00:30) (92% - 98%)    Parameters below as of 13 Apr 2024 00:30  Patient On (Oxygen Delivery Method): room air        Gen: NAD, AOx3  CV: RRR  Pulm: CTAB  Abdomen: soft, nondistended, appropriately tender  Incision: clean dry and intact at transverse incision and umbilicus. Surya drains bilaterally with minimal serosanguinous fluid  Extrem: no calf tenderness or edema     Labs:                                     9.0    10.01 )-----------( 205      ( 12 Apr 2024 06:23 )             27.8     04-12    138  |  103  |  9.8  ----------------------------<  85  3.8   |  25.0  |  0.83    Ca    7.9<L>      12 Apr 2024 06:23  Mg     1.9     04-12    TPro  5.6<L>  /  Alb  3.1<L>  /  TBili  <0.2<L>  /  DBili  x   /  AST  20  /  ALT  12  /  AlkPhos  61  04-12

## 2024-04-13 NOTE — PROGRESS NOTE ADULT - SUBJECTIVE AND OBJECTIVE BOX
POD # 3    Patient resting comfortably in NAD  Afebrile VSS  Abdomen  soft  Not tender  Extrem  No Homans  Abdom. binder in place  Voiding +  Flatus +    WBC 9.66  H/H 10.3/32  Platel. 247    Patient s/p BRIANNA/BSO  No evidence of DVT / PE,,,, mild atelectasis  DC to home   F/U office 3 days     Asymptomatic anemia secondary to cute blood loss at [facility]

## 2024-04-13 NOTE — PROGRESS NOTE ADULT - SUBJECTIVE AND OBJECTIVE BOX
CARMEN DANA    [id number]    48y      Female    Patient is a 48y old  Female who presents with a chief complaint of open hysterectomy, bilateral salpingoophorectomy (11 Apr 2024 17:39)      INTERVAL HPI/OVERNIGHT EVENTS:    patient is feeling better, denies fever, chills, chest pain, sob, dizziness     Vital Signs Last 24 Hrs  T(C): 36.5 (13 Apr 2024 09:20), Max: 36.9 (12 Apr 2024 16:57)  T(F): 97.7 (13 Apr 2024 09:20), Max: 98.4 (12 Apr 2024 16:57)  HR: 82 (13 Apr 2024 09:20) (78 - 95)  BP: 126/85 (13 Apr 2024 09:20) (110/75 - 136/83)  RR: 18 (13 Apr 2024 09:20) (18 - 18)  SpO2: 94% (13 Apr 2024 09:20) (92% - 98%)    Parameters below as of 13 Apr 2024 09:20  Patient On (Oxygen Delivery Method): room air        PHYSICAL EXAM:  GENERAL: Middle age female looking comfortable    HEENT: PERRL, +EOMI  NECK: soft, Supple, No JVD   CHEST/LUNG: Clear to auscultate bilaterally; No wheezing  HEART: S1S2+, Regular rate and rhythm; No murmurs  ABDOMEN: Soft, Nontender, Nondistended; surgical wounds with dressings on,  Bowel sounds present, she has drain in place   EXTREMITIES:  1+ Peripheral Pulses, No edema  SKIN: No rashes or lesions  NEURO: AAOX3  PSYCH: normal mood      LABS:                        10.3   9.66  )-----------( 247      ( 13 Apr 2024 06:26 )             32.0     04-13    140  |  104  |  8.5  ----------------------------<  124<H>  4.9   |  24.0  |  0.75    Ca    9.2      13 Apr 2024 06:26  Mg     1.9     04-12    TPro  6.8  /  Alb  3.5  /  TBili  0.2<L>  /  DBili  x   /  AST  23  /  ALT  15  /  AlkPhos  79  04-13    PT/INR - ( 12 Apr 2024 14:34 )   PT: 10.3 sec;   INR: 0.93 ratio         PTT - ( 12 Apr 2024 14:34 )  PTT:27.9 sec  Urinalysis Basic - ( 13 Apr 2024 06:26 )    Color: x / Appearance: x / SG: x / pH: x  Gluc: 124 mg/dL / Ketone: x  / Bili: x / Urobili: x   Blood: x / Protein: x / Nitrite: x   Leuk Esterase: x / RBC: x / WBC x   Sq Epi: x / Non Sq Epi: x / Bacteria: x          I&O's Summary    12 Apr 2024 07:01  -  13 Apr 2024 07:00  --------------------------------------------------------  IN: 480 mL / OUT: 2745 mL / NET: -2265 mL    13 Apr 2024 07:01  -  13 Apr 2024 10:41  --------------------------------------------------------  IN: 360 mL / OUT: 250 mL / NET: 110 mL        MEDICATIONS  (STANDING):  acetaminophen     Tablet .. 975 milliGRAM(s) Oral every 6 hours  acetaminophen     Tablet .. 975 milliGRAM(s) Oral every 6 hours  ascorbic acid 500 milliGRAM(s) Oral daily  famotidine    Tablet 20 milliGRAM(s) Oral daily  ferrous    sulfate 325 milliGRAM(s) Oral daily  folic acid 1 milliGRAM(s) Oral daily  gentamicin   IVPB 330 milliGRAM(s) IV Intermittent once  ibuprofen  Tablet. 600 milliGRAM(s) Oral every 6 hours  influenza   Vaccine 0.5 milliLiter(s) IntraMuscular once  polyethylene glycol 3350 17 Gram(s) Oral daily  prenatal multivitamin 1 Tablet(s) Oral daily  senna 2 Tablet(s) Oral at bedtime    MEDICATIONS  (PRN):  ondansetron    Tablet 8 milliGRAM(s) Oral every 8 hours PRN Nausea and/or Vomiting  oxyCODONE    IR 10 milliGRAM(s) Oral every 4 hours PRN Severe Pain (7 - 10)  oxyCODONE    IR 5 milliGRAM(s) Oral every 3 hours PRN Moderate Pain (4 - 6)  simethicone 80 milliGRAM(s) Chew every 6 hours PRN Gas

## 2024-04-13 NOTE — DISCHARGE NOTE NURSING/CASE MANAGEMENT/SOCIAL WORK - PATIENT PORTAL LINK FT
You can access the FollowMyHealth Patient Portal offered by [facility] by registering at the following website: [email or web address]. By joining [facility]’s FollowMyHealth portal, you will also be able to view your health information using other applications (apps) compatible with our system.

## 2024-04-15 LAB — SURGICAL PATHOLOGY STUDY: SIGNIFICANT CHANGE UP

## 2024-04-16 ENCOUNTER — APPOINTMENT (OUTPATIENT)
Dept: OBGYN | Facility: CLINIC | Age: 49
End: 2024-04-16
Payer: COMMERCIAL

## 2024-04-16 VITALS
HEIGHT: 64 IN | SYSTOLIC BLOOD PRESSURE: 116 MMHG | WEIGHT: 183 LBS | HEART RATE: 130 BPM | BODY MASS INDEX: 31.24 KG/M2 | DIASTOLIC BLOOD PRESSURE: 78 MMHG

## 2024-04-16 PROCEDURE — 99024 POSTOP FOLLOW-UP VISIT: CPT

## 2024-04-16 NOTE — HISTORY OF PRESENT ILLNESS
[Pain is well-controlled] : pain is well-controlled [Fever] : no fever [Chills] : no chills [Nausea] : no nausea [Vomiting] : no vomiting [Diarrhea] : no diarrhea [Vaginal Bleeding] : no vaginal bleeding [Pelvic Pressure] : no pelvic pressure [Dysuria] : no dysuria [Vaginal Discharge] : no vaginal discharge [Constipation] : no constipation [None] : no vaginal bleeding [Pathology reviewed] : pathology reviewed [de-identified] : 6 [de-identified] : [facility]/BSO [de-identified] : Fibroid uterus, menorrhagia

## 2024-04-16 NOTE — PLAN
[FreeTextEntry1] : Patient is a 48-year-old  2 para 2 last menstrual period Patient's status post supracervical hysterectomy bilateral salpingo-oophorectomy on April 10, 2024 Patient also underwent an abdominoplasty by Dr. Linares Patient denies any difficulty passing urine, passing flatus, or bowel movements Patient has abdominal binder in place and dressings and a STEVEN drain Incision not examined since she was just seen by the plastic surgeon for dressing care Essentially benign postop exam Pathology report reviewed Questions answered Patient is understands  He was present as a chaperone for the entire assessment and examination of this patient

## 2024-05-06 ENCOUNTER — APPOINTMENT (OUTPATIENT)
Dept: OBGYN | Facility: CLINIC | Age: 49
End: 2024-05-06

## 2024-05-07 ENCOUNTER — ASOB RESULT (OUTPATIENT)
Age: 49
End: 2024-05-07

## 2024-05-07 ENCOUNTER — APPOINTMENT (OUTPATIENT)
Dept: OBGYN | Facility: CLINIC | Age: 49
End: 2024-05-07
Payer: COMMERCIAL

## 2024-05-07 VITALS
SYSTOLIC BLOOD PRESSURE: 127 MMHG | HEART RATE: 92 BPM | DIASTOLIC BLOOD PRESSURE: 91 MMHG | BODY MASS INDEX: 31.24 KG/M2 | WEIGHT: 183 LBS | HEIGHT: 64 IN

## 2024-05-07 DIAGNOSIS — N95.1 MENOPAUSAL AND FEMALE CLIMACTERIC STATES: ICD-10-CM

## 2024-05-07 PROCEDURE — 76830 TRANSVAGINAL US NON-OB: CPT

## 2024-05-07 PROCEDURE — 99212 OFFICE O/P EST SF 10 MIN: CPT

## 2024-05-07 PROCEDURE — 76856 US EXAM PELVIC COMPLETE: CPT | Mod: 59

## 2024-05-07 RX ORDER — ESTRADIOL 2 MG/1
2 TABLET ORAL
Qty: 90 | Refills: 3 | Status: ACTIVE | COMMUNITY
Start: 2024-05-07 | End: 1900-01-01

## 2024-05-31 NOTE — BRIEF OPERATIVE NOTE - PRIMARY SURGEON
Dr. Hermosillo Please initiate PA for Zolmitriptan 5 mg, #30 tablets/month.       Thank you.    JAIME Whitt on 5/31/2024 at 4:52 PM

## 2024-12-27 ENCOUNTER — APPOINTMENT (OUTPATIENT)
Dept: PLASTIC SURGERY | Facility: CLINIC | Age: 49
End: 2024-12-27

## 2025-01-15 ENCOUNTER — NON-APPOINTMENT (OUTPATIENT)
Age: 50
End: 2025-01-15

## (undated) DEVICE — VENODYNE/SCD SLEEVE CALF MEDIUM

## (undated) DEVICE — DRAPE 1/2 SHEET 40X57"

## (undated) DEVICE — PACK MAJOR ABDOMINAL SUPINE

## (undated) DEVICE — PACK MAJOR ABDOMINAL WITH LAP

## (undated) DEVICE — PREP TRAY DRY SKIN PREP SCRUB

## (undated) DEVICE — DRSG TELFA 3 X 4

## (undated) DEVICE — DRSG VAC GRANUFOAM LARGE (BLACK)

## (undated) DEVICE — DRAPE 3/4 SHEET 52X76"

## (undated) DEVICE — LAP PAD W RING 18 X 18"

## (undated) DEVICE — POSITIONER FOAM EGG CRATE ULNAR 2PCS (PINK)

## (undated) DEVICE — AVETA FLUID MANAGEMENT ACCESSORY

## (undated) DEVICE — DRSG CURITY GAUZE SPONGE 4 X 4" 12-PLY

## (undated) DEVICE — GOWN TRIMAX LG

## (undated) DEVICE — GLV 6.5 PROTEXIS (WHITE)

## (undated) DEVICE — DRAPE LIGHT HANDLE COVER (BLUE)

## (undated) DEVICE — TUBING SUCTION NONCONDUCTIVE 6MM X 12FT

## (undated) DEVICE — ELCTR BOVIE PENCIL HANDPIECE

## (undated) DEVICE — GLV 8.5 PROTEXIS (WHITE)

## (undated) DEVICE — DRSG MEDIPORE DRESS IT 7-7/8 X 11"

## (undated) DEVICE — SUCTION YANKAUER NO CONTROL VENT

## (undated) DEVICE — SOL BAG NS 0.9% 1000ML

## (undated) DEVICE — GLV 7.5 PROTEXIS (WHITE)

## (undated) DEVICE — PACK LITHOTOMY

## (undated) DEVICE — DRAPE INSTRUMENT POUCH 6.75" X 11"

## (undated) DEVICE — PRESSURE INFUSOR BAG 1000ML

## (undated) DEVICE — DRSG 4 X 8

## (undated) DEVICE — VISITEC 4X4

## (undated) DEVICE — DRAPE MAGNETIC INSTRUMENT MEDIUM

## (undated) DEVICE — TUBING IRR SET FOR CYSTOSCOPY 77"

## (undated) DEVICE — URETERAL CATH RED RUBBER 16FR (ORANGE)

## (undated) DEVICE — VENODYNE/SCD SLEEVE CALF LARGE

## (undated) DEVICE — ELCTR GROUNDING PAD ADULT COVIDIEN

## (undated) DEVICE — FOLEY TRAY 16FR 5CC LF UMETER CLOSED

## (undated) DEVICE — SPECIMEN CONTAINER 100ML

## (undated) DEVICE — DRAPE FLUID WARMER 44 X 44"

## (undated) DEVICE — TUBING SUCTION 20FT

## (undated) DEVICE — WARMING BLANKET UPPER ADULT

## (undated) DEVICE — DRAIN JACKSON PRATT 7MM FLAT FULL NO TROCAR

## (undated) DEVICE — SOL IRR POUR NS 0.9% 1000ML

## (undated) DEVICE — STAPLER SKIN VISI-STAT 35 WIDE

## (undated) DEVICE — SUT PLAIN GUT 3-0 27" V-20

## (undated) DEVICE — MEDICATION LABELS W MARKER

## (undated) DEVICE — STAPLER SKIN PROXIMATE

## (undated) DEVICE — DRAPE MAJOR ABDOMINAL W POUCHES

## (undated) DEVICE — Device

## (undated) DEVICE — SOL IRR POUR H2O 250ML

## (undated) DEVICE — GOWN LG

## (undated) DEVICE — DRAIN JACKSON PRATT 10MM FLAT FULL NO TROCAR

## (undated) DEVICE — GLV 7 PROTEXIS (WHITE)

## (undated) DEVICE — SUT MONOCRYL 3-0 27" KS CS-1 UNDYED

## (undated) DEVICE — BLADE SCALPEL SAFETYLOCK #10

## (undated) DEVICE — DRAPE LIGHT HANDLE COVER (GREEN)

## (undated) DEVICE — DRSG OPSITE 13.75 X 4"

## (undated) DEVICE — DRSG VAC WHITEFOAM LARGE (WHITE)

## (undated) DEVICE — LIGASURE ATLAS 10MM 20CM

## (undated) DEVICE — SUT VICRYL 1 36" CT-1 UNDYED

## (undated) DEVICE — DRSG STERISTRIPS 0.5 X 4"

## (undated) DEVICE — PREP CHLORAPREP HI-LITE ORANGE 26ML

## (undated) DEVICE — TUBING IV EXTENSION MACRO 2Y-CLAVE 32"

## (undated) DEVICE — FOLEY TRAY 16FR 5CC LTX UMETER CLOSED

## (undated) DEVICE — DRAPE MAYO STAND 30"

## (undated) DEVICE — LAP PAD 18 X 18"

## (undated) DEVICE — DRSG TEGADERM 6"X8"

## (undated) DEVICE — AVETA FLUID MANAGEMENT ACCESSORY W CAP

## (undated) DEVICE — GOWN XL W TOWEL

## (undated) DEVICE — LIGASURE IMPACT

## (undated) DEVICE — DRAPE 3/4 SHEET W REINFORCEMENT 56X77"

## (undated) DEVICE — DRAPE TOWEL BLUE 17" X 24"

## (undated) DEVICE — DRAIN RESERVOIR FOR JACKSON PRATT 100CC CARDINAL

## (undated) DEVICE — SOL IRR POUR H2O 1000ML

## (undated) DEVICE — SUCTION YANKAUER TAPERED BULBOUS NO VENT

## (undated) DEVICE — PREP DYNA-HEX CHG 4% 4OZ BOTTLE (BACTOSHIELD)

## (undated) DEVICE — SOL IRR POUR NS 0.9% 500ML

## (undated) DEVICE — GLV 8 PROTEXIS (WHITE)

## (undated) DEVICE — BLADE SCALPEL SAFETYLOCK #15

## (undated) DEVICE — GOWN XL